# Patient Record
Sex: FEMALE | Race: ASIAN | NOT HISPANIC OR LATINO | Employment: FULL TIME | URBAN - METROPOLITAN AREA
[De-identification: names, ages, dates, MRNs, and addresses within clinical notes are randomized per-mention and may not be internally consistent; named-entity substitution may affect disease eponyms.]

---

## 2017-01-10 ENCOUNTER — PATIENT MESSAGE (OUTPATIENT)
Dept: INTERNAL MEDICINE | Facility: CLINIC | Age: 37
End: 2017-01-10

## 2017-01-10 DIAGNOSIS — E03.4 HYPOTHYROIDISM DUE TO ACQUIRED ATROPHY OF THYROID: Primary | ICD-10-CM

## 2017-01-11 DIAGNOSIS — I10 ESSENTIAL HYPERTENSION: ICD-10-CM

## 2017-01-11 RX ORDER — HYDROCHLOROTHIAZIDE 12.5 MG/1
12.5 TABLET ORAL DAILY
Qty: 30 TABLET | Refills: 0 | Status: SHIPPED | OUTPATIENT
Start: 2017-01-11 | End: 2017-06-13 | Stop reason: SDUPTHER

## 2017-01-12 RX ORDER — LEVOTHYROXINE SODIUM 75 UG/1
75 TABLET ORAL DAILY
Qty: 30 TABLET | Refills: 5 | Status: SHIPPED | OUTPATIENT
Start: 2017-01-12 | End: 2017-06-13 | Stop reason: SDUPTHER

## 2017-03-24 ENCOUNTER — LAB VISIT (OUTPATIENT)
Dept: LAB | Facility: HOSPITAL | Age: 37
End: 2017-03-24
Attending: FAMILY MEDICINE
Payer: COMMERCIAL

## 2017-03-24 DIAGNOSIS — E03.4 HYPOTHYROIDISM DUE TO ACQUIRED ATROPHY OF THYROID: ICD-10-CM

## 2017-03-24 LAB — TSH SERPL DL<=0.005 MIU/L-ACNC: 3.78 UIU/ML

## 2017-03-24 PROCEDURE — 84443 ASSAY THYROID STIM HORMONE: CPT

## 2017-03-24 PROCEDURE — 36415 COLL VENOUS BLD VENIPUNCTURE: CPT | Mod: PO

## 2017-06-13 ENCOUNTER — PATIENT MESSAGE (OUTPATIENT)
Dept: INTERNAL MEDICINE | Facility: CLINIC | Age: 37
End: 2017-06-13

## 2017-06-13 DIAGNOSIS — I10 ESSENTIAL HYPERTENSION: ICD-10-CM

## 2017-06-15 RX ORDER — METOPROLOL SUCCINATE 100 MG/1
100 TABLET, EXTENDED RELEASE ORAL DAILY
Qty: 90 TABLET | Refills: 3 | Status: SHIPPED | OUTPATIENT
Start: 2017-06-15 | End: 2018-07-09 | Stop reason: SDUPTHER

## 2017-06-15 RX ORDER — LEVOTHYROXINE SODIUM 75 UG/1
75 TABLET ORAL DAILY
Qty: 90 TABLET | Refills: 3 | Status: SHIPPED | OUTPATIENT
Start: 2017-06-15 | End: 2018-07-09 | Stop reason: SDUPTHER

## 2017-06-15 RX ORDER — HYDROCHLOROTHIAZIDE 12.5 MG/1
12.5 TABLET ORAL DAILY
Qty: 90 TABLET | Refills: 3 | Status: SHIPPED | OUTPATIENT
Start: 2017-06-15 | End: 2017-10-23

## 2017-09-05 ENCOUNTER — LAB VISIT (OUTPATIENT)
Dept: LAB | Facility: HOSPITAL | Age: 37
End: 2017-09-05
Attending: FAMILY MEDICINE
Payer: COMMERCIAL

## 2017-09-05 DIAGNOSIS — E03.2 IATROGENIC HYPOTHYROIDISM: ICD-10-CM

## 2017-09-05 DIAGNOSIS — I10 ESSENTIAL HYPERTENSION: ICD-10-CM

## 2017-09-05 LAB
ANION GAP SERPL CALC-SCNC: 6 MMOL/L
BUN SERPL-MCNC: 14 MG/DL
CALCIUM SERPL-MCNC: 9.4 MG/DL
CHLORIDE SERPL-SCNC: 108 MMOL/L
CHOLEST SERPL-MCNC: 188 MG/DL
CHOLEST/HDLC SERPL: 3.5 {RATIO}
CO2 SERPL-SCNC: 28 MMOL/L
CREAT SERPL-MCNC: 0.8 MG/DL
EST. GFR  (AFRICAN AMERICAN): >60 ML/MIN/1.73 M^2
EST. GFR  (NON AFRICAN AMERICAN): >60 ML/MIN/1.73 M^2
GLUCOSE SERPL-MCNC: 90 MG/DL
HDLC SERPL-MCNC: 54 MG/DL
HDLC SERPL: 28.7 %
LDLC SERPL CALC-MCNC: 121 MG/DL
NONHDLC SERPL-MCNC: 134 MG/DL
POTASSIUM SERPL-SCNC: 3.8 MMOL/L
SODIUM SERPL-SCNC: 142 MMOL/L
TRIGL SERPL-MCNC: 65 MG/DL
TSH SERPL DL<=0.005 MIU/L-ACNC: 1.64 UIU/ML

## 2017-09-05 PROCEDURE — 80048 BASIC METABOLIC PNL TOTAL CA: CPT

## 2017-09-05 PROCEDURE — 84443 ASSAY THYROID STIM HORMONE: CPT

## 2017-09-05 PROCEDURE — 36415 COLL VENOUS BLD VENIPUNCTURE: CPT | Mod: PO

## 2017-09-05 PROCEDURE — 80061 LIPID PANEL: CPT

## 2017-09-18 ENCOUNTER — TELEPHONE (OUTPATIENT)
Dept: OBSTETRICS AND GYNECOLOGY | Facility: CLINIC | Age: 37
End: 2017-09-18

## 2017-09-18 DIAGNOSIS — Z30.41 ENCOUNTER FOR SURVEILLANCE OF CONTRACEPTIVE PILLS: ICD-10-CM

## 2017-09-18 DIAGNOSIS — I10 ESSENTIAL HYPERTENSION: ICD-10-CM

## 2017-09-19 RX ORDER — ACETAMINOPHEN AND CODEINE PHOSPHATE 120; 12 MG/5ML; MG/5ML
1 SOLUTION ORAL DAILY
Qty: 30 TABLET | Refills: 2 | Status: SHIPPED | OUTPATIENT
Start: 2017-09-19 | End: 2018-10-30

## 2017-09-19 NOTE — TELEPHONE ENCOUNTER
Attempted to call pt, no answer left message stating that refill was sent to pharmacy and pt would need to make a annual appointment in October. Message also stated that if pt have any questions she can give us a call at the clinic.

## 2017-10-23 ENCOUNTER — OFFICE VISIT (OUTPATIENT)
Dept: INTERNAL MEDICINE | Facility: CLINIC | Age: 37
End: 2017-10-23
Payer: COMMERCIAL

## 2017-10-23 VITALS
TEMPERATURE: 97 F | OXYGEN SATURATION: 98 % | WEIGHT: 151.88 LBS | HEIGHT: 59 IN | BODY MASS INDEX: 30.62 KG/M2 | SYSTOLIC BLOOD PRESSURE: 134 MMHG | DIASTOLIC BLOOD PRESSURE: 94 MMHG | HEART RATE: 60 BPM

## 2017-10-23 DIAGNOSIS — Z00.00 ROUTINE HEALTH MAINTENANCE: Primary | ICD-10-CM

## 2017-10-23 PROCEDURE — 99999 PR PBB SHADOW E&M-EST. PATIENT-LVL III: CPT | Mod: PBBFAC,,, | Performed by: FAMILY MEDICINE

## 2017-10-23 PROCEDURE — 99395 PREV VISIT EST AGE 18-39: CPT | Mod: 25,S$GLB,, | Performed by: FAMILY MEDICINE

## 2017-10-23 PROCEDURE — 90715 TDAP VACCINE 7 YRS/> IM: CPT | Mod: S$GLB,,, | Performed by: FAMILY MEDICINE

## 2017-10-23 PROCEDURE — 90471 IMMUNIZATION ADMIN: CPT | Mod: S$GLB,,, | Performed by: FAMILY MEDICINE

## 2017-10-23 NOTE — PROGRESS NOTES
Subjective:       Patient ID: Sabrina Ureña is a female.    Chief Complaint: here for physical examination and issues  below    HPI Hypertension: blood pressures typ  Normal.120/80s homoep[rev attrib elev to drs office;Tolerating medicine. Now off hctz  Hypothyroidism:nl tsh. Tolerating med. asympt    D/wd wt gain since last visit. No exerc /diet modif    Past Medical History   Diagnosis Date    Hypertension     Hyperthyroidism      s/p radioact thy ablation    Iatrogenic hypothyroidism      Past Surgical History   Procedure Laterality Date    Radioactive ablation thyroid       5/13    Breast augmentation       Family History   Problem Relation Age of Onset    Hyperlipidemia Mother     Hypertension Mother     Gout Father      Social History     Social History    Marital status: Single     Spouse name: N/A    Number of children: 1    Years of education: N/A     Social History Main Topics    Smoking status: Never Smoker    Smokeless tobacco: Never Used    Alcohol use No    Drug use: No    Sexual activity: Yes     Partners: Male     Birth control/ protection: OCP     Other Topics Concern    None     Social History Narrative    Planned marriage fall 2015       Review of Systems  Cardiovascular: no chest pain  Chest: no shortness of breath  Abd: no abd pain  Remainder review of systems negative    Objective:      Physical Exam   Constitutional: She is oriented to person, place, and time. She appears well-developed and well-nourished. No distress.   HENT:   Head: Atraumatic.   Right Ear: External ear normal.   Left Ear: External ear normal.   Nose: Nose normal.   Mouth/Throat: Oropharynx is clear and moist. No oropharyngeal exudate.   bilat tms nl   Eyes: Conjunctivae and EOM are normal. Pupils are equal, round, and reactive to light. No scleral icterus.   Neck: Normal range of motion. Neck supple. No thyromegaly present.   Cardiovascular: Normal rate, regular rhythm and normal heart sounds.    No  murmur heard.  Pulmonary/Chest: Effort normal and breath sounds normal. No respiratory distress. She has no wheezes. She has no rales.   Abdominal: Soft. Bowel sounds are normal. She exhibits no distension and no mass. There is no hepatosplenomegaly. There is no tenderness. There is no rebound and no guarding.   Musculoskeletal: Normal range of motion. She exhibits no edema or tenderness.   Lymphadenopathy:     She has no cervical adenopathy.   Neurological: She is alert and oriented to person, place, and time. No cranial nerve deficit. She exhibits normal muscle tone. Coordination normal.   Skin: Skin is warm. No rash noted. No erythema. No pallor.   Psychiatric: She has a normal mood and affect. Her behavior is normal. Judgment and thought content normal.   Nursing note and vitals reviewed.      Assessment:       1. Routine health maintenance    2. Essential hypertension    3. Iatrogenic hypothyroidism        Plan:       **  Nurse bp check1*-2 weeks with bp machine check  Lab 12 months and follow up after  Routine health maintenance  -     Basic metabolic panel; Future; Expected date: 10/23/2018  -     Lipid panel; Future; Expected date: 10/23/2018  -     TSH; Future; Expected date: 10/23/2018    Other orders  -     (In Office Administered) Tdap Vaccine    declines flu shot    Add: 12/18 dropped off home bps all 130/80 x a couple bit elev

## 2017-12-05 ENCOUNTER — TELEPHONE (OUTPATIENT)
Dept: INTERNAL MEDICINE | Facility: CLINIC | Age: 37
End: 2017-12-05

## 2017-12-05 NOTE — TELEPHONE ENCOUNTER
----- Message from Vikki Kidd sent at 12/5/2017 11:55 AM CST -----  Pt at [phone]//states she is calling to make a nurse appt to have her bp checked//please call//rupert/yvonne

## 2017-12-07 ENCOUNTER — CLINICAL SUPPORT (OUTPATIENT)
Dept: INTERNAL MEDICINE | Facility: CLINIC | Age: 37
End: 2017-12-07
Payer: COMMERCIAL

## 2017-12-07 ENCOUNTER — TELEPHONE (OUTPATIENT)
Dept: INTERNAL MEDICINE | Facility: CLINIC | Age: 37
End: 2017-12-07

## 2017-12-07 VITALS — SYSTOLIC BLOOD PRESSURE: 138 MMHG | DIASTOLIC BLOOD PRESSURE: 88 MMHG

## 2017-12-07 NOTE — PROGRESS NOTES
Pt presented to clinic for bp check. 1st bp 148/86 and 2nd bp 138/88. Pts bp at home ranges 113-143/66-91.

## 2017-12-07 NOTE — TELEPHONE ENCOUNTER
----- Message from Wendy Francois MA sent at 12/7/2017  9:01 AM CST -----  Pt presented to clinic for bp check. 1st bp 148/86 and 2nd bp 138/88. Pts bp at home ranges 113-143/66-91.

## 2017-12-13 ENCOUNTER — PATIENT MESSAGE (OUTPATIENT)
Dept: INTERNAL MEDICINE | Facility: CLINIC | Age: 37
End: 2017-12-13

## 2018-06-18 DIAGNOSIS — Z12.39 BREAST CANCER SCREENING: ICD-10-CM

## 2018-07-09 RX ORDER — LEVOTHYROXINE SODIUM 75 UG/1
75 TABLET ORAL DAILY
Qty: 90 TABLET | Refills: 3 | Status: SHIPPED | OUTPATIENT
Start: 2018-07-09 | End: 2019-07-08 | Stop reason: SDUPTHER

## 2018-07-09 RX ORDER — METOPROLOL SUCCINATE 100 MG/1
100 TABLET, EXTENDED RELEASE ORAL DAILY
Qty: 90 TABLET | Refills: 3 | Status: SHIPPED | OUTPATIENT
Start: 2018-07-09 | End: 2019-07-08 | Stop reason: SDUPTHER

## 2018-10-16 ENCOUNTER — PATIENT OUTREACH (OUTPATIENT)
Dept: ADMINISTRATIVE | Facility: HOSPITAL | Age: 38
End: 2018-10-16

## 2018-10-23 ENCOUNTER — LAB VISIT (OUTPATIENT)
Dept: LAB | Facility: HOSPITAL | Age: 38
End: 2018-10-23
Attending: FAMILY MEDICINE
Payer: COMMERCIAL

## 2018-10-23 DIAGNOSIS — Z00.00 ROUTINE HEALTH MAINTENANCE: ICD-10-CM

## 2018-10-23 LAB
ANION GAP SERPL CALC-SCNC: 9 MMOL/L
BUN SERPL-MCNC: 17 MG/DL
CALCIUM SERPL-MCNC: 10.1 MG/DL
CHLORIDE SERPL-SCNC: 103 MMOL/L
CHOLEST SERPL-MCNC: 219 MG/DL
CHOLEST/HDLC SERPL: 3.7 {RATIO}
CO2 SERPL-SCNC: 25 MMOL/L
CREAT SERPL-MCNC: 0.8 MG/DL
EST. GFR  (AFRICAN AMERICAN): >60 ML/MIN/1.73 M^2
EST. GFR  (NON AFRICAN AMERICAN): >60 ML/MIN/1.73 M^2
GLUCOSE SERPL-MCNC: 81 MG/DL
HDLC SERPL-MCNC: 59 MG/DL
HDLC SERPL: 26.9 %
LDLC SERPL CALC-MCNC: 143.2 MG/DL
NONHDLC SERPL-MCNC: 160 MG/DL
POTASSIUM SERPL-SCNC: 4 MMOL/L
SODIUM SERPL-SCNC: 137 MMOL/L
TRIGL SERPL-MCNC: 84 MG/DL
TSH SERPL DL<=0.005 MIU/L-ACNC: 2.99 UIU/ML

## 2018-10-23 PROCEDURE — 84443 ASSAY THYROID STIM HORMONE: CPT

## 2018-10-23 PROCEDURE — 36415 COLL VENOUS BLD VENIPUNCTURE: CPT | Mod: PO

## 2018-10-23 PROCEDURE — 80048 BASIC METABOLIC PNL TOTAL CA: CPT

## 2018-10-23 PROCEDURE — 80061 LIPID PANEL: CPT

## 2018-10-29 ENCOUNTER — HOSPITAL ENCOUNTER (OUTPATIENT)
Dept: RADIOLOGY | Facility: HOSPITAL | Age: 38
Discharge: HOME | End: 2018-10-29
Attending: FAMILY MEDICINE
Payer: COMMERCIAL

## 2018-10-29 DIAGNOSIS — Z12.39 BREAST CANCER SCREENING: ICD-10-CM

## 2018-10-30 ENCOUNTER — OFFICE VISIT (OUTPATIENT)
Dept: INTERNAL MEDICINE | Facility: CLINIC | Age: 38
End: 2018-10-30
Payer: COMMERCIAL

## 2018-10-30 ENCOUNTER — LAB VISIT (OUTPATIENT)
Dept: LAB | Facility: HOSPITAL | Age: 38
End: 2018-10-30
Attending: FAMILY MEDICINE
Payer: COMMERCIAL

## 2018-10-30 VITALS
DIASTOLIC BLOOD PRESSURE: 86 MMHG | HEART RATE: 56 BPM | HEIGHT: 59 IN | SYSTOLIC BLOOD PRESSURE: 122 MMHG | WEIGHT: 154.56 LBS | TEMPERATURE: 99 F | BODY MASS INDEX: 31.16 KG/M2

## 2018-10-30 DIAGNOSIS — E03.2 IATROGENIC HYPOTHYROIDISM: ICD-10-CM

## 2018-10-30 DIAGNOSIS — Z12.31 ENCOUNTER FOR SCREENING MAMMOGRAM FOR MALIGNANT NEOPLASM OF BREAST: ICD-10-CM

## 2018-10-30 DIAGNOSIS — I10 ESSENTIAL HYPERTENSION: ICD-10-CM

## 2018-10-30 DIAGNOSIS — Z00.00 ROUTINE HEALTH MAINTENANCE: Primary | ICD-10-CM

## 2018-10-30 LAB — B-HCG UR QL: NEGATIVE

## 2018-10-30 PROCEDURE — 81025 URINE PREGNANCY TEST: CPT

## 2018-10-30 PROCEDURE — 99396 PREV VISIT EST AGE 40-64: CPT | Mod: S$GLB,,, | Performed by: FAMILY MEDICINE

## 2018-10-30 PROCEDURE — 99999 PR PBB SHADOW E&M-EST. PATIENT-LVL III: CPT | Mod: PBBFAC,,, | Performed by: FAMILY MEDICINE

## 2018-10-30 NOTE — PROGRESS NOTES
Subjective:       Patient ID: Sabrina Ureña is a female.    Chief Complaint: here for physical examination and issues  below    HPI Hypertension: blood pressures typ  Normal.lerating medicine.  Hypothyroidism:nl tsh. Tolerating med. asympt    D/wd wt gain since last visit. No exerc /diet modif    Past Medical History   Diagnosis Date    Hypertension     Hyperthyroidism      s/p radioact thy ablation    Iatrogenic hypothyroidism      Past Surgical History   Procedure Laterality Date    Radioactive ablation thyroid       5/13    Breast augmentation       Family History   Problem Relation Age of Onset    Hyperlipidemia Mother     Hypertension Mother     Gout Father      Social History     Social History    Marital status: Single     Spouse name: N/A    Number of children: 1    Years of education: N/A     Social History Main Topics    Smoking status: Never Smoker    Smokeless tobacco: Never Used    Alcohol use No    Drug use: No    Sexual activity: Yes     Partners: Male     Birth control/ protection: OCP     Other Topics Concern    None     Social History Narrative    Planned marriage fall 2015       Review of Systems  Cardiovascular: no chest pain  Chest: no shortness of breath  Abd: no abd pain  Remainder review of systems negative    Objective:      Physical Exam   Constitutional: She is oriented to person, place, and time. She appears well-developed and well-nourished. No distress.   HENT:   Head: Atraumatic.   Right Ear: External ear normal.   Left Ear: External ear normal.   Nose: Nose normal.   Mouth/Throat: Oropharynx is clear and moist. No oropharyngeal exudate.   bilat tms nl   Eyes: Conjunctivae and EOM are normal. Pupils are equal, round, and reactive to light. No scleral icterus.   Neck: Normal range of motion. Neck supple. No thyromegaly present.   Cardiovascular: Normal rate, regular rhythm and normal heart sounds.    No murmur heard.  Pulmonary/Chest: Effort normal and breath sounds  normal. No respiratory distress. She has no wheezes. She has no rales.   Abdominal: Soft. Bowel sounds are normal. She exhibits no distension and no mass. There is no hepatosplenomegaly. There is no tenderness. There is no rebound and no guarding.   Musculoskeletal: Normal range of motion. She exhibits no edema or tenderness.   Lymphadenopathy:     She has no cervical adenopathy.   Neurological: She is alert and oriented to person, place, and time. No cranial nerve deficit. She exhibits normal muscle tone. Coordination normal.   Skin: Skin is warm. No rash noted. No erythema. No pallor.   Psychiatric: She has a normal mood and affect. Her behavior is normal. Judgment and thought content normal.   Nursing note and vitals reviewed.      Assessment:       1. Routine health maintenance    2. Essential hypertension    3. Iatrogenic hypothyroidism        Plan:       **  Lab 12 months and follow up after  declines flu shot this time. May consider next year  req upt upcoming mammo  She will sched pap  Routine health maintenance    Essential hypertension  -     Basic metabolic panel; Future; Expected date: 10/30/2019  -     Lipid panel; Future; Expected date: 10/30/2019  -     Pregnancy, urine rapid; Future; Expected date: 10/30/2018    Iatrogenic hypothyroidism  -     TSH; Future; Expected date: 10/30/2019    Encounter for screening mammogram for malignant neoplasm of breast  -     Cancel: Mammo Digital Screening Bilat; Future; Expected date: 10/30/2018

## 2018-11-02 DIAGNOSIS — Z12.39 BREAST CANCER SCREENING: ICD-10-CM

## 2019-06-11 ENCOUNTER — PATIENT MESSAGE (OUTPATIENT)
Dept: INTERNAL MEDICINE | Facility: CLINIC | Age: 39
End: 2019-06-11

## 2019-06-11 RX ORDER — ACYCLOVIR 400 MG/1
400 TABLET ORAL 2 TIMES DAILY
Qty: 10 TABLET | Refills: 11 | Status: ON HOLD | OUTPATIENT
Start: 2019-06-11 | End: 2021-12-15 | Stop reason: SDUPTHER

## 2019-07-09 RX ORDER — METOPROLOL SUCCINATE 100 MG/1
100 TABLET, EXTENDED RELEASE ORAL DAILY
Qty: 90 TABLET | Refills: 3 | Status: SHIPPED | OUTPATIENT
Start: 2019-07-09 | End: 2019-11-05 | Stop reason: ALTCHOICE

## 2019-07-09 RX ORDER — LEVOTHYROXINE SODIUM 75 UG/1
75 TABLET ORAL DAILY
Qty: 90 TABLET | Refills: 3 | Status: SHIPPED | OUTPATIENT
Start: 2019-07-09 | End: 2019-12-04 | Stop reason: ALTCHOICE

## 2019-10-02 ENCOUNTER — PATIENT MESSAGE (OUTPATIENT)
Dept: INTERNAL MEDICINE | Facility: CLINIC | Age: 39
End: 2019-10-02

## 2019-10-02 DIAGNOSIS — E03.4 HYPOTHYROIDISM DUE TO ACQUIRED ATROPHY OF THYROID: ICD-10-CM

## 2019-10-02 DIAGNOSIS — Z32.01 PREGNANCY TEST POSITIVE: ICD-10-CM

## 2019-10-02 DIAGNOSIS — I10 ESSENTIAL HYPERTENSION: Primary | ICD-10-CM

## 2019-10-10 ENCOUNTER — PATIENT OUTREACH (OUTPATIENT)
Dept: ADMINISTRATIVE | Facility: HOSPITAL | Age: 39
End: 2019-10-10

## 2019-10-11 ENCOUNTER — PATIENT MESSAGE (OUTPATIENT)
Dept: INTERNAL MEDICINE | Facility: CLINIC | Age: 39
End: 2019-10-11

## 2019-10-11 NOTE — TELEPHONE ENCOUNTER
Please change lab to what I just ordered adding serum preg test; can do today if she wants; instead of 10.13

## 2019-10-11 NOTE — TELEPHONE ENCOUNTER
Odette reply sent advising pt per Dr. Telles pregnancy blood test ordered and lab appt r/s to today 10/11/19.

## 2019-10-18 ENCOUNTER — LAB VISIT (OUTPATIENT)
Dept: LAB | Facility: HOSPITAL | Age: 39
End: 2019-10-18
Attending: FAMILY MEDICINE

## 2019-10-18 DIAGNOSIS — I10 ESSENTIAL HYPERTENSION: ICD-10-CM

## 2019-10-18 DIAGNOSIS — E03.2 IATROGENIC HYPOTHYROIDISM: ICD-10-CM

## 2019-10-18 DIAGNOSIS — Z32.01 PREGNANCY TEST POSITIVE: ICD-10-CM

## 2019-10-18 LAB
ANION GAP SERPL CALC-SCNC: 11 MMOL/L (ref 8–16)
BUN SERPL-MCNC: 12 MG/DL (ref 6–20)
CALCIUM SERPL-MCNC: 9.9 MG/DL (ref 8.7–10.5)
CHLORIDE SERPL-SCNC: 103 MMOL/L (ref 95–110)
CHOLEST SERPL-MCNC: 206 MG/DL (ref 120–199)
CHOLEST/HDLC SERPL: 3.7 {RATIO} (ref 2–5)
CO2 SERPL-SCNC: 21 MMOL/L (ref 23–29)
CREAT SERPL-MCNC: 0.8 MG/DL (ref 0.5–1.4)
EST. GFR  (AFRICAN AMERICAN): >60 ML/MIN/1.73 M^2
EST. GFR  (NON AFRICAN AMERICAN): >60 ML/MIN/1.73 M^2
GLUCOSE SERPL-MCNC: 92 MG/DL (ref 70–110)
HCG INTACT+B SERPL-ACNC: NORMAL MIU/ML
HDLC SERPL-MCNC: 55 MG/DL (ref 40–75)
HDLC SERPL: 26.7 % (ref 20–50)
LDLC SERPL CALC-MCNC: 110.4 MG/DL (ref 63–159)
NONHDLC SERPL-MCNC: 151 MG/DL
POTASSIUM SERPL-SCNC: 4 MMOL/L (ref 3.5–5.1)
SODIUM SERPL-SCNC: 135 MMOL/L (ref 136–145)
T4 FREE SERPL-MCNC: 1.05 NG/DL (ref 0.71–1.51)
TRIGL SERPL-MCNC: 203 MG/DL (ref 30–150)
TSH SERPL DL<=0.005 MIU/L-ACNC: 5.08 UIU/ML (ref 0.4–4)

## 2019-10-18 PROCEDURE — 84439 ASSAY OF FREE THYROXINE: CPT

## 2019-10-18 PROCEDURE — 36415 COLL VENOUS BLD VENIPUNCTURE: CPT

## 2019-10-18 PROCEDURE — 80048 BASIC METABOLIC PNL TOTAL CA: CPT

## 2019-10-18 PROCEDURE — 80061 LIPID PANEL: CPT

## 2019-10-18 PROCEDURE — 84443 ASSAY THYROID STIM HORMONE: CPT

## 2019-10-18 PROCEDURE — 84702 CHORIONIC GONADOTROPIN TEST: CPT

## 2019-10-22 ENCOUNTER — PATIENT MESSAGE (OUTPATIENT)
Dept: INTERNAL MEDICINE | Facility: CLINIC | Age: 39
End: 2019-10-22

## 2019-10-22 DIAGNOSIS — E03.4 HYPOTHYROIDISM DUE TO ACQUIRED ATROPHY OF THYROID: Primary | ICD-10-CM

## 2019-10-23 ENCOUNTER — PATIENT MESSAGE (OUTPATIENT)
Dept: ADMINISTRATIVE | Facility: HOSPITAL | Age: 39
End: 2019-10-23

## 2019-10-23 ENCOUNTER — PATIENT OUTREACH (OUTPATIENT)
Dept: ADMINISTRATIVE | Facility: HOSPITAL | Age: 39
End: 2019-10-23

## 2019-10-23 NOTE — TELEPHONE ENCOUNTER
Please let her know we can keep the dose the same and basilio in a month with no missed doses and taking in am one hour prior to anything else by mouth except water.   Basilio tsh in a month  Also she can discuss with OB she will be seeing for an alternative plan if they feel better

## 2019-10-28 ENCOUNTER — OFFICE VISIT (OUTPATIENT)
Dept: OBSTETRICS AND GYNECOLOGY | Facility: CLINIC | Age: 39
End: 2019-10-28
Payer: COMMERCIAL

## 2019-10-28 ENCOUNTER — LAB VISIT (OUTPATIENT)
Dept: LAB | Facility: HOSPITAL | Age: 39
End: 2019-10-28
Attending: OBSTETRICS & GYNECOLOGY
Payer: COMMERCIAL

## 2019-10-28 VITALS
BODY MASS INDEX: 30.55 KG/M2 | SYSTOLIC BLOOD PRESSURE: 133 MMHG | DIASTOLIC BLOOD PRESSURE: 88 MMHG | WEIGHT: 151.25 LBS

## 2019-10-28 DIAGNOSIS — N91.2 AMENORRHEA: ICD-10-CM

## 2019-10-28 DIAGNOSIS — N91.2 AMENORRHEA: Primary | ICD-10-CM

## 2019-10-28 LAB
ABO + RH BLD: NORMAL
BASOPHILS # BLD AUTO: 0.04 K/UL (ref 0–0.2)
BASOPHILS NFR BLD: 0.4 % (ref 0–1.9)
DIFFERENTIAL METHOD: ABNORMAL
EOSINOPHIL # BLD AUTO: 0.1 K/UL (ref 0–0.5)
EOSINOPHIL NFR BLD: 0.9 % (ref 0–8)
ERYTHROCYTE [DISTWIDTH] IN BLOOD BY AUTOMATED COUNT: 13.2 % (ref 11.5–14.5)
HCT VFR BLD AUTO: 38.9 % (ref 37–48.5)
HGB BLD-MCNC: 12.7 G/DL (ref 12–16)
IMM GRANULOCYTES # BLD AUTO: 0.04 K/UL (ref 0–0.04)
IMM GRANULOCYTES NFR BLD AUTO: 0.4 % (ref 0–0.5)
LYMPHOCYTES # BLD AUTO: 1.5 K/UL (ref 1–4.8)
LYMPHOCYTES NFR BLD: 16.3 % (ref 18–48)
MCH RBC QN AUTO: 30.5 PG (ref 27–31)
MCHC RBC AUTO-ENTMCNC: 32.6 G/DL (ref 32–36)
MCV RBC AUTO: 93 FL (ref 82–98)
MONOCYTES # BLD AUTO: 0.9 K/UL (ref 0.3–1)
MONOCYTES NFR BLD: 9.6 % (ref 4–15)
NEUTROPHILS # BLD AUTO: 6.8 K/UL (ref 1.8–7.7)
NEUTROPHILS NFR BLD: 72.4 % (ref 38–73)
NRBC BLD-RTO: 0 /100 WBC
PLATELET # BLD AUTO: 251 K/UL (ref 150–350)
PMV BLD AUTO: 10.6 FL (ref 9.2–12.9)
RBC # BLD AUTO: 4.17 M/UL (ref 4–5.4)
WBC # BLD AUTO: 9.33 K/UL (ref 3.9–12.7)

## 2019-10-28 PROCEDURE — 86592 SYPHILIS TEST NON-TREP QUAL: CPT

## 2019-10-28 PROCEDURE — 99999 PR PBB SHADOW E&M-EST. PATIENT-LVL II: ICD-10-PCS | Mod: PBBFAC,,, | Performed by: OBSTETRICS & GYNECOLOGY

## 2019-10-28 PROCEDURE — 36415 COLL VENOUS BLD VENIPUNCTURE: CPT

## 2019-10-28 PROCEDURE — 81220 CFTR GENE COM VARIANTS: CPT

## 2019-10-28 PROCEDURE — 0500F INITIAL PRENATAL CARE VISIT: CPT | Mod: S$GLB,,, | Performed by: OBSTETRICS & GYNECOLOGY

## 2019-10-28 PROCEDURE — 85025 COMPLETE CBC W/AUTO DIFF WBC: CPT

## 2019-10-28 PROCEDURE — 83020 HEMOGLOBIN ELECTROPHORESIS: CPT

## 2019-10-28 PROCEDURE — 0500F PR INITIAL PRENATAL CARE VISIT: ICD-10-PCS | Mod: S$GLB,,, | Performed by: OBSTETRICS & GYNECOLOGY

## 2019-10-28 PROCEDURE — 86703 HIV-1/HIV-2 1 RESULT ANTBDY: CPT

## 2019-10-28 PROCEDURE — 99999 PR PBB SHADOW E&M-EST. PATIENT-LVL II: CPT | Mod: PBBFAC,,, | Performed by: OBSTETRICS & GYNECOLOGY

## 2019-10-28 PROCEDURE — 87340 HEPATITIS B SURFACE AG IA: CPT

## 2019-10-28 PROCEDURE — 86762 RUBELLA ANTIBODY: CPT

## 2019-10-28 PROCEDURE — 86901 BLOOD TYPING SEROLOGIC RH(D): CPT

## 2019-10-28 NOTE — PATIENT INSTRUCTIONS
Adapting to Pregnancy: First Trimester  As your body adjusts, you may have to change or limit your daily activities. Youll need more rest. You may also need to use the energy you have more wisely.     Eat stomach-friendly foods like cottage cheese, crackers, or bread throughout the day.   Your changing body  Almost every part of your body is affected as you adapt to pregnancy. The uterus and cervix will begin to soften right away. You may not look very pregnant during the first 3 months. But you are likely to have some common signs of early pregnancy:  · Nausea  · Fatigue  · Frequent urination  · Mood swings  · Bloating of the abdomen  · Missed or light periods (first trimester bleeding)  · Nipple or breast tenderness, breast swelling  Its not too late to start good habits  What matters most is protecting your baby from this moment on. If you smoke, drink alcohol, or use drugs, now is the time to stop. If you need help, talk with your healthcare provider.  · Smoking increases the risk of  stillbirth or having a low-birth-weight baby. If you smoke, quit now.  · Alcohol and drugs have been linked with miscarriage, birth defects, intellectual disability, and low birth weight. Do not drink alcohol or take drugs.  Tips to relieve nausea  Although nausea can happen at any time of the day, it may be worse in the morning. To help prevent nausea:  · Eat small, light meals at frequent intervals.  · Get up slowly. Eat a few unsalted crackers before you get out of bed.  · Avoid smells that bother you.  · Avoid spicy and fatty foods.  · Eat an ice pop in your favorite flavor.  · Get plenty of rest.  · Ask your healthcare provider about taking mir or vitamin B6 for nausea and vomiting.  · Talk with your healthcare provider if you take vitamins that upset your stomach.  Work concerns  The end of the first trimester is a good time to discuss working during pregnancy with your employer. Follow your healthcare providers  advice if your job requires you to stand for a long time, work with hazardous tools, or even sit at a desk all day. Your workspace, workload, or scheduled hours may need to be adjusted. Perhaps you can change body postures more often or take an extra break.  Advice for travel  Talk to your healthcare provider first, but the second trimester may be the best time for any travel. You may be advised to avoid certain trips while youre pregnant. Food and water can be concerns in developing countries. Travel by car is a good choice, as you can stop, get out, and stretch. Bring snacks and water along. Fasten the lap belt below your belly, low over your hips. Also be sure to wear the shoulder harness.  Intimacy  Unless your healthcare provider tells you to, there is no reason to stop having sex while youre pregnant. You or your partner may notice changes in desire. Desire may be less in the first trimester, due to nausea and fatigue. In the second trimester, sex may be very enjoyable. The third trimester can be a challenge comfort-wise. Try different positions and see whats best for you both.  Date Last Reviewed: 8/16/2015  © 0660-0001 [employer] [address] [place] [address], [place] [address]. All rights reserved. This information is not intended as a substitute for professional medical care. Always follow your healthcare professional's instructions.

## 2019-10-28 NOTE — PROGRESS NOTES
Subjective:      Sabrina Ureña is a 39 y.o. female who presents for evaluation of amenorrhea. She believes she could be pregnant. unexpected, but will continue the pregnancy Sexual Activity: single partner, contraception: none. Current symptoms also include: breast tenderness, positive home pregnancy test and 5 home pregnancy tests. Last period was abnormal.     Patient's last menstrual period was 2019 (within days).      Review of Systems  Constitutional: negative  Eyes: negative  Ears, nose, mouth, throat, and face: negative  Respiratory: negative  Cardiovascular: negative  Gastrointestinal: negative  Genitourinary:negative  Integument/breast: negative  Hematologic/lymphatic: negative  Musculoskeletal:negative  Neurological: negative  Behavioral/Psych: negative  Endocrine: negative  Allergic/Immunologic: negative       Objective:      /88   Wt 68.6 kg (151 lb 3.8 oz)   LMP 2019 (Within Days)   BMI 30.55 kg/m²   General: no acute distress      Lab Review  Urine HCG: positive      Assessment:      Absence of menstruation.       Plan:      Pregnancy Test: Positive: EDC: 06/15/2020. Briefly discussed pre- care options. Pregnancy, Childbirth and the [place] book given. Encouraged well-balanced diet, plenty of rest when needed, pre-gisella vitamins daily and walking for exercise. Discussed self-help for nausea, avoiding OTC medications until consulting provider or pharmacist, other than Tylenol as needed, minimal caffeine (1-2 cups daily) and avoiding alcohol. She will schedule her initial OB visit in the next month with her PCP or OB provider. Feel free to call with any questions. RTO 1 week for first prenatal visit and ultrasound      Continue blood pressure medication  Will do genetic screening after dates are confirmed  Patient wants amino and mfs consult.

## 2019-10-29 LAB
HBV SURFACE AG SERPL QL IA: NEGATIVE
HIV 1+2 AB+HIV1 P24 AG SERPL QL IA: NEGATIVE
RPR SER QL: NORMAL
RUBV IGG SER-ACNC: 12.2 IU/ML
RUBV IGG SER-IMP: REACTIVE

## 2019-10-30 LAB
CFTR MUT ANL BLD/T: NORMAL
HGB A2 MFR BLD HPLC: 2.6 % (ref 2.2–3.2)
HGB FRACT BLD ELPH-IMP: NORMAL
HGB FRACT BLD ELPH-IMP: NORMAL

## 2019-11-04 ENCOUNTER — OFFICE VISIT (OUTPATIENT)
Dept: INTERNAL MEDICINE | Facility: CLINIC | Age: 39
End: 2019-11-04
Payer: COMMERCIAL

## 2019-11-04 VITALS
SYSTOLIC BLOOD PRESSURE: 140 MMHG | BODY MASS INDEX: 30.27 KG/M2 | HEIGHT: 59 IN | TEMPERATURE: 98 F | DIASTOLIC BLOOD PRESSURE: 80 MMHG | WEIGHT: 150.13 LBS

## 2019-11-04 DIAGNOSIS — E03.2 IATROGENIC HYPOTHYROIDISM: ICD-10-CM

## 2019-11-04 DIAGNOSIS — I10 ESSENTIAL HYPERTENSION: ICD-10-CM

## 2019-11-04 DIAGNOSIS — Z00.00 ROUTINE HEALTH MAINTENANCE: Primary | ICD-10-CM

## 2019-11-04 PROCEDURE — 99999 PR PBB SHADOW E&M-EST. PATIENT-LVL III: ICD-10-PCS | Mod: PBBFAC,,, | Performed by: FAMILY MEDICINE

## 2019-11-04 PROCEDURE — 99396 PREV VISIT EST AGE 40-64: CPT | Mod: S$GLB,,, | Performed by: FAMILY MEDICINE

## 2019-11-04 PROCEDURE — 99999 PR PBB SHADOW E&M-EST. PATIENT-LVL III: CPT | Mod: PBBFAC,,, | Performed by: FAMILY MEDICINE

## 2019-11-04 PROCEDURE — 99396 PR PREVENTIVE VISIT,EST,40-64: ICD-10-PCS | Mod: S$GLB,,, | Performed by: FAMILY MEDICINE

## 2019-11-04 PROCEDURE — 90471 IMMUNIZATION ADMIN: CPT | Mod: PBBFAC

## 2019-11-04 NOTE — PROGRESS NOTES
Subjective:       Patient ID: Sabrina Ureña is a female.    Chief Complaint: here for physical examination and issues  below    HPI Hypertension: blood pressures typ  Normal.lerating medicine.  Hypothyroidism:5 tsh. Had missed some doses . D/wd dosing. Tolerating med. asympt    +_ pregnancy test. U/s and ob planned tomorrow. She has d/wd current bp med and plan basilio tsh and OB dr delgado with this per her    Past Medical History   Diagnosis Date    Hypertension     Hyperthyroidism      s/p radioact thy ablation    Iatrogenic hypothyroidism      Past Surgical History   Procedure Laterality Date    Radioactive ablation thyroid       5/13    Breast augmentation       Family History   Problem Relation Age of Onset    Hyperlipidemia Mother     Hypertension Mother     Gout Father      Social History     Social History    Marital status: Single     Spouse name: N/A    Number of children: 1    Years of education: N/A     Social History Main Topics    Smoking status: Never Smoker    Smokeless tobacco: Never Used    Alcohol use No    Drug use: No    Sexual activity: Yes     Partners: Male     Birth control/ protection: OCP     Other Topics Concern    None     Social History Narrative           Review of Systems  Cardiovascular: no chest pain  Chest: no shortness of breath  Abd: no abd pain  Remainder review of systems negative    Objective:      Physical Exam   Constitutional: She is oriented to person, place, and time. She appears well-developed and well-nourished. No distress.   HENT:   Head: Atraumatic.   Right Ear: External ear normal.   Left Ear: External ear normal.   Nose: Nose normal.   Mouth/Throat: Oropharynx is clear and moist. No oropharyngeal exudate.   bilat tms nl   Eyes: Conjunctivae and EOM are normal. Pupils are equal, round, and reactive to light. No scleral icterus.   Neck: Normal range of motion. Neck supple. No thyromegaly present.   Cardiovascular: Normal rate, regular rhythm and normal  heart sounds.    No murmur heard.  Pulmonary/Chest: Effort normal and breath sounds normal. No respiratory distress. She has no wheezes. She has no rales.   Abdominal: Soft. Bowel sounds are normal. She exhibits no distension and no mass. There is no hepatosplenomegaly. There is no tenderness. There is no rebound and no guarding.   Musculoskeletal: Normal range of motion. She exhibits no edema or tenderness.   Lymphadenopathy:     She has no cervical adenopathy.   Neurological: She is alert and oriented to person, place, and time. No cranial nerve deficit. She exhibits normal muscle tone. Coordination normal.   Skin: Skin is warm. No rash noted. No erythema. No pallor.   Psychiatric: She has a normal mood and affect. Her behavior is normal. Judgment and thought content normal.   Nursing note and vitals reviewed.      Assessment:       1. Routine health maintenance    2. Essential hypertension    3. Iatrogenic hypothyroidism      + preg test  Plan:       **tsh few weeks (s/p taking med consistently )    flu shot  Lab 12 months and follow up after    Routine health maintenance    Iatrogenic hypothyroidism  -     TSH; Future; Expected date: 11/03/2020    Essential hypertension  -     Comprehensive metabolic panel; Future; Expected date: 11/03/2020  -     Lipid panel; Future; Expected date: 11/03/2020    tsh last week November  Also Lab including tsh 12 months and follow up after  nurse blood pressure visit one week    Monitor /record home blood pressures different times today after 2 minute rest and drop off with nurse blood pressure visit one week    Add: I spoke w dr chisholm via [employer] early 12/19. She is adjusting and recking thyroid

## 2019-11-04 NOTE — PATIENT INSTRUCTIONS
Monitor /record home blood pressures different times today after 2 minute rest and drop off with nurse blood pressure visit one week

## 2019-11-05 ENCOUNTER — INITIAL PRENATAL (OUTPATIENT)
Dept: OBSTETRICS AND GYNECOLOGY | Facility: CLINIC | Age: 39
End: 2019-11-05
Payer: COMMERCIAL

## 2019-11-05 ENCOUNTER — PROCEDURE VISIT (OUTPATIENT)
Dept: OBSTETRICS AND GYNECOLOGY | Facility: CLINIC | Age: 39
End: 2019-11-05
Payer: COMMERCIAL

## 2019-11-05 VITALS
SYSTOLIC BLOOD PRESSURE: 130 MMHG | BODY MASS INDEX: 30.37 KG/M2 | WEIGHT: 150.38 LBS | DIASTOLIC BLOOD PRESSURE: 70 MMHG

## 2019-11-05 DIAGNOSIS — Z34.91 INITIAL OBSTETRIC VISIT IN FIRST TRIMESTER: Primary | ICD-10-CM

## 2019-11-05 DIAGNOSIS — O09.521 MULTIGRAVIDA OF ADVANCED MATERNAL AGE IN FIRST TRIMESTER: ICD-10-CM

## 2019-11-05 DIAGNOSIS — Z36.3 ANTENATAL SCREENING FOR MALFORMATION USING ULTRASONICS: ICD-10-CM

## 2019-11-05 DIAGNOSIS — N91.2 AMENORRHEA: ICD-10-CM

## 2019-11-05 LAB
BACTERIAL VAGINOSIS DNA: NEGATIVE
C TRACH DNA SPEC QL NAA+PROBE: NOT DETECTED
CANDIDA GLABRATA DNA: NEGATIVE
CANDIDA KRUSEI DNA: NEGATIVE
CANDIDA RRNA VAG QL PROBE: NEGATIVE
N GONORRHOEA DNA SPEC QL NAA+PROBE: NOT DETECTED
T VAGINALIS RRNA GENITAL QL PROBE: NEGATIVE

## 2019-11-05 PROCEDURE — 0500F PR INITIAL PRENATAL CARE VISIT: ICD-10-PCS | Mod: S$GLB,,, | Performed by: OBSTETRICS & GYNECOLOGY

## 2019-11-05 PROCEDURE — 99999 PR PBB SHADOW E&M-EST. PATIENT-LVL III: ICD-10-PCS | Mod: PBBFAC,,, | Performed by: OBSTETRICS & GYNECOLOGY

## 2019-11-05 PROCEDURE — 88175 CYTOPATH C/V AUTO FLUID REDO: CPT

## 2019-11-05 PROCEDURE — 99999 PR PBB SHADOW E&M-EST. PATIENT-LVL III: CPT | Mod: PBBFAC,,, | Performed by: OBSTETRICS & GYNECOLOGY

## 2019-11-05 PROCEDURE — 87661 TRICHOMONAS VAGINALIS AMPLIF: CPT

## 2019-11-05 PROCEDURE — 87481 CANDIDA DNA AMP PROBE: CPT | Mod: 59

## 2019-11-05 PROCEDURE — 87086 URINE CULTURE/COLONY COUNT: CPT

## 2019-11-05 PROCEDURE — 0500F INITIAL PRENATAL CARE VISIT: CPT | Mod: S$GLB,,, | Performed by: OBSTETRICS & GYNECOLOGY

## 2019-11-05 PROCEDURE — 87491 CHLMYD TRACH DNA AMP PROBE: CPT

## 2019-11-05 PROCEDURE — 87801 DETECT AGNT MULT DNA AMPLI: CPT

## 2019-11-05 PROCEDURE — 76801 OB US < 14 WKS SINGLE FETUS: CPT | Mod: S$GLB,,, | Performed by: OBSTETRICS & GYNECOLOGY

## 2019-11-05 PROCEDURE — 76801 OB US < 14 WKS SINGLE FETUS: CPT | Mod: PBBFAC | Performed by: OBSTETRICS & GYNECOLOGY

## 2019-11-05 PROCEDURE — 76801 PR US, OB <14WKS, TRANSABD, SINGLE GESTATION: ICD-10-PCS | Mod: S$GLB,,, | Performed by: OBSTETRICS & GYNECOLOGY

## 2019-11-05 RX ORDER — LABETALOL 100 MG/1
100 TABLET, FILM COATED ORAL EVERY 12 HOURS
Qty: 60 TABLET | Refills: 5 | Status: SHIPPED | OUTPATIENT
Start: 2019-11-05 | End: 2019-12-04 | Stop reason: ALTCHOICE

## 2019-11-05 NOTE — PATIENT INSTRUCTIONS
Adapting to Pregnancy: First Trimester  As your body adjusts, you may have to change or limit your daily activities. Youll need more rest. You may also need to use the energy you have more wisely.     Eat stomach-friendly foods like cottage cheese, crackers, or bread throughout the day.   Your changing body  Almost every part of your body is affected as you adapt to pregnancy. The uterus and cervix will begin to soften right away. You may not look very pregnant during the first 3 months. But you are likely to have some common signs of early pregnancy:  · Nausea  · Fatigue  · Frequent urination  · Mood swings  · Bloating of the abdomen  · Missed or light periods (first trimester bleeding)  · Nipple or breast tenderness, breast swelling  Its not too late to start good habits  What matters most is protecting your baby from this moment on. If you smoke, drink alcohol, or use drugs, now is the time to stop. If you need help, talk with your healthcare provider.  · Smoking increases the risk of  stillbirth or having a low-birth-weight baby. If you smoke, quit now.  · Alcohol and drugs have been linked with miscarriage, birth defects, intellectual disability, and low birth weight. Do not drink alcohol or take drugs.  Tips to relieve nausea  Although nausea can happen at any time of the day, it may be worse in the morning. To help prevent nausea:  · Eat small, light meals at frequent intervals.  · Get up slowly. Eat a few unsalted crackers before you get out of bed.  · Avoid smells that bother you.  · Avoid spicy and fatty foods.  · Eat an ice pop in your favorite flavor.  · Get plenty of rest.  · Ask your healthcare provider about taking mir or vitamin B6 for nausea and vomiting.  · Talk with your healthcare provider if you take vitamins that upset your stomach.  Work concerns  The end of the first trimester is a good time to discuss working during pregnancy with your employer. Follow your healthcare providers  advice if your job requires you to stand for a long time, work with hazardous tools, or even sit at a desk all day. Your workspace, workload, or scheduled hours may need to be adjusted. Perhaps you can change body postures more often or take an extra break.  Advice for travel  Talk to your healthcare provider first, but the second trimester may be the best time for any travel. You may be advised to avoid certain trips while youre pregnant. Food and water can be concerns in developing countries. Travel by car is a good choice, as you can stop, get out, and stretch. Bring snacks and water along. Fasten the lap belt below your belly, low over your hips. Also be sure to wear the shoulder harness.  Intimacy  Unless your healthcare provider tells you to, there is no reason to stop having sex while youre pregnant. You or your partner may notice changes in desire. Desire may be less in the first trimester, due to nausea and fatigue. In the second trimester, sex may be very enjoyable. The third trimester can be a challenge comfort-wise. Try different positions and see whats best for you both.  Date Last Reviewed: 8/16/2015  © 3455-2161 [employer]. [address] [place] [address], [place] [address]. All rights reserved. This information is not intended as a substitute for professional medical care. Always follow your healthcare professional's instructions.

## 2019-11-05 NOTE — PROGRESS NOTES
Will change blood pressure medication to labetalol.  Dates confirmed with ultrasound today.  Prenatal physical, pap, and cultures taken today.  RTO 1 week for NT.  All questions answered.

## 2019-11-07 LAB — BACTERIA UR CULT: NO GROWTH

## 2019-11-12 ENCOUNTER — PROCEDURE VISIT (OUTPATIENT)
Dept: OBSTETRICS AND GYNECOLOGY | Facility: CLINIC | Age: 39
End: 2019-11-12
Payer: COMMERCIAL

## 2019-11-12 ENCOUNTER — PATIENT MESSAGE (OUTPATIENT)
Dept: OBSTETRICS AND GYNECOLOGY | Facility: CLINIC | Age: 39
End: 2019-11-12

## 2019-11-12 DIAGNOSIS — Z36.3 ANTENATAL SCREENING FOR MALFORMATION USING ULTRASONICS: Primary | ICD-10-CM

## 2019-11-12 DIAGNOSIS — Z36.3 ANTENATAL SCREENING FOR MALFORMATION USING ULTRASONICS: ICD-10-CM

## 2019-11-19 ENCOUNTER — TELEPHONE (OUTPATIENT)
Dept: OBSTETRICS AND GYNECOLOGY | Facility: CLINIC | Age: 39
End: 2019-11-19

## 2019-11-19 NOTE — TELEPHONE ENCOUNTER
----- Message from Cristine Brown sent at 11/19/2019 10:06 AM CST -----  Contact: [facility]  Needs a medical necessity form for patients US that is scheduled for Friday, please fax to [phone], please call her back if needed at [phone]. Thank you

## 2019-11-20 ENCOUNTER — TELEPHONE (OUTPATIENT)
Dept: OBSTETRICS AND GYNECOLOGY | Facility: CLINIC | Age: 39
End: 2019-11-20

## 2019-11-20 NOTE — TELEPHONE ENCOUNTER
Spoke with Madelin, Message forward to Dr hong staff. Lex MCCONNELL ----- Message from Joie Paniagua sent at 11/20/2019 10:16 AM CST -----  Contact: Madelin with [employer]  Type: Needs Medical Advice    Who Called:  Madelin with [facility]  Symptoms (please be specific):  na  How long has patient had these symptoms:  na  Pharmacy name and phone #:  na  Best Call Back Number: [phone]  Additional Information: states that she has left several messages. States that she is scheduled for an ultrasound on tomorrow and it is crucial that she receives a call back today. Please call to advise/discuss, thank you!

## 2019-11-20 NOTE — TELEPHONE ENCOUNTER
Returned call to (PRIETO with [facility]), no answer, left voicemail to return call to clinic.

## 2019-11-20 NOTE — TELEPHONE ENCOUNTER
----- Message from Katherin Ambriz sent at 11/20/2019  4:28 PM CST -----  [facility]  Pt is needing ultrasound on Friday. Kaylee has faxed request for pt and has not received a response. Kaylee states this is an urgent matter for pt. Please call Kaylee back at [phone].

## 2019-11-25 ENCOUNTER — TELEPHONE (OUTPATIENT)
Dept: OBSTETRICS AND GYNECOLOGY | Facility: CLINIC | Age: 39
End: 2019-11-25

## 2019-11-25 NOTE — TELEPHONE ENCOUNTER
----- Message from Katherin Ambriz sent at 11/25/2019 11:45 AM CST -----  Contact: Kaylee-   Type:  Patient Returning Call    Who Called: Kaylee  Who Left Message for Patient: Julia  Does the patient know what this is regarding?: n/a  Would the patient rather a call back or a response via [facility]? call  Best Call Back Number: [phone]  Additional Information: n/a

## 2019-11-25 NOTE — TELEPHONE ENCOUNTER
Spoke with Kaylee with [facility] regarding patient records . Kaylee states patient was scheduled for a NT ultrasound but had to cancel due to her insurance only covering 2 ultrasound during her pregnancy. Records was faxed to Kaylee at [phone].

## 2019-11-27 ENCOUNTER — TELEPHONE (OUTPATIENT)
Dept: OBSTETRICS AND GYNECOLOGY | Facility: CLINIC | Age: 39
End: 2019-11-27

## 2019-11-27 NOTE — TELEPHONE ENCOUNTER
----- Message from Cristine Brown sent at 11/27/2019  1:04 PM CST -----  Contact: - [facility]  Has been waiting for a long time on an copy of patients US report, but has never received anything, patient has already had to reschedule her surgery once. Please fax to [phone], please call her back at [phone]. Thank you

## 2019-11-27 NOTE — TELEPHONE ENCOUNTER
Returned call to  in regards to not receiving a copy of the pt US report. After phone rang twice the call went dead. I called twice. Same thing happened.

## 2019-11-30 ENCOUNTER — LAB VISIT (OUTPATIENT)
Dept: LAB | Facility: HOSPITAL | Age: 39
End: 2019-11-30
Attending: FAMILY MEDICINE
Payer: COMMERCIAL

## 2019-11-30 DIAGNOSIS — E03.4 HYPOTHYROIDISM DUE TO ACQUIRED ATROPHY OF THYROID: ICD-10-CM

## 2019-11-30 LAB
T4 FREE SERPL-MCNC: 0.74 NG/DL (ref 0.71–1.51)
TSH SERPL DL<=0.005 MIU/L-ACNC: 12.83 UIU/ML (ref 0.4–4)

## 2019-11-30 PROCEDURE — 84443 ASSAY THYROID STIM HORMONE: CPT

## 2019-11-30 PROCEDURE — 36415 COLL VENOUS BLD VENIPUNCTURE: CPT

## 2019-11-30 PROCEDURE — 84439 ASSAY OF FREE THYROXINE: CPT

## 2019-12-02 NOTE — TELEPHONE ENCOUNTER
Attempted to call pt, no answer, unable to leave message. Will try again later.   Chief complaint:   Chief Complaint   Patient presents with   • Follow-up     patient recently seen by Sophy Morillo NP       Vitals:  Visit Vitals  /80 (BP Location: LUE - Left upper extremity, Patient Position: Sitting, Cuff Size: Large Adult)   Pulse 88   Ht 5' 7\" (1.702 m)   Wt 83.9 kg   BMI 28.98 kg/m²       HISTORY OF PRESENT ILLNESS     HPI    Patient presents for follow-up visit.  He had seen NP 11/5/19:  \"Anxiety feels like heart is racing at times. Tightness in chest. His 23 year old son with history of drug abuse lapsed and overdosed. He has been off of work for 3 weeks .was to return to work tomorrow but is unable due to his physical symptoms . In May he was given zoloft for depression but this did not help and he did not fu or continue the med after 30 days . He denies suicidal or homicidal ideation or plan. He notes that he has a 3 year old grandson to help raise and help his family with the loss. He did not seek counseling through his work prefers to do this privately     ...   He plans to retire from [employer] Summer 2020 and work part time as .       Anxiety and depression due to life stressor.      Plan   effexor 37.5 mg once daily  #30  List of support groups for parents who lost children will be given.   Off work for one week.   Fu with Dr. Haynes in one month \"      Anxiety. Improved with Effexor, he will continue.   Grief reaction to loss of his son, he passed away due tooverdose.     Hx mild hyperglycemia, 103 in 2012. NL 2015. NL labs at work in 2019.   FHx DM (both parents).     Overweight. BMI 28. Wt 179 lbs.     Hx IBS, Hx chronic abd pains. Improved with dicyclomine prn. GI follows.   Hx mild sigmoid diverticulosis on colonoscopy 8/2013. NL otherwise.     Hx chronic LBP. Hx herniated disk L4-5 in 2004, s/p MRI, s/p PT, s/p spinal injections. He used either diclofenac or aleve prn.           Other significant problems:  Patient Active Problem List    Diagnosis Date Noted   •  Osteoarthritis of carpometacarpal (CMC) joints of both thumbs 10/08/2019     Priority: Low   • Folliculitis 07/25/2016     Priority: Low   • Major depressive disorder with single episode, in partial remission (CMS/[facility]) 07/25/2016     Priority: Low   • IBS (irritable bowel syndrome) 05/05/2015     Priority: Low   • FH: diabetes mellitus 05/05/2015     Priority: Low   • Acne 05/05/2015     Priority: Low   • Sigmoid diverticulosis 05/05/2015     Priority: Low   • Hyperglycemia 05/05/2015     Priority: Low   • Tendinitis of iliotibial band 08/21/2013     Priority: Low   • Hypercholesterolemia      Priority: Low   • Displacement of lumbar intervertebral disc without myelopathy 06/11/2007     Priority: Low       PAST MEDICAL, FAMILY AND SOCIAL HISTORY     Current Outpatient Medications   Medication Sig Dispense Refill   • celecoxib (CELEBREX) 200 MG capsule TAKE ONE CAPSULE BY MOUTH TWICE DAILY 60 capsule 2   • venlafaxine XR (EFFEXOR XR) 37.5 MG 24 hr capsule Take 1 capsule by mouth daily. 30 capsule 0   • clindamycin (CLINDAGEL) 1 % gel APPLY BID TO NECK/ACNE  3   • RESTASIS 0.05 % ophthalmic emulsion INT 1 GTT IN OU BID  0   • omeprazole (PRILOSEC) 40 MG capsule TK ONE C PO BID  3   • MULTI-VITAMIN PO TABS 1 TABLET DAILY 30 0     No current facility-administered medications for this visit.        Allergies:  ALLERGIES:   Allergen Reactions   • Tussionex Pennkinetic Er [Chlorpheniramine-Hydrocodone] RASH     rash and itching        Past Medical  History/Surgeries:  Past Medical History:   Diagnosis Date   • Degeneration of intervertebral disc, site unspecified    • Gastro-oesophageal reflux disease    • Hoarseness    • Hypercholesterolemia    • Migraine headache    • Negative History of CA, HTN, DM, CAD, CVA, DVT, Asthma    • Negative History of ulcer, renal, liver, thyroid,TB,RF    • Varicella without mention of complication childhood       Past Surgical History:   Procedure Laterality Date   • Lasik surgery  2/25/2005     both eyes   • Past surgical history  8/13/09    Esophagram video with speech - velopharyngeal regurgitation and trace to mild in-and-out laryngeal penetration with thin-consistency per straw and cup - [facility] - for post-tonsillectomy dypshagia   • Removal of sperm duct(s)  2/6/08    Vasectomy   • Removal of tonsils,10+ y/o     • Shldr arthroscop,part acromioplas  10/8/02    Left shoulder arthroscopic subacromial decompression       Family History:  Family History   Problem Relation Age of Onset   • Diabetes Maternal Grandmother    • Hypertension Father    • Diabetes Father    • Other Brother         lupus   • Stroke Paternal Grandmother    • NEGATIVE FAMILY HX OF Paternal Grandfather    • Other Maternal Grandfather         unknown   • Diabetes Mother        Social History:  Social History     Tobacco Use   • Smoking status: Current Some Day Smoker     Packs/day: 0.00     Types: Cigars   • Smokeless tobacco: Never Used   Substance Use Topics   • Alcohol use: Yes     Alcohol/week: 2.5 standard drinks     Types: 3 Standard drinks or equivalent per week     Comment: social        REVIEW OF SYSTEMS     Review of Systems   All other systems reviewed and are negative.      PHYSICAL EXAM     Physical Exam  Vitals signs reviewed.   Constitutional:       General: He is not in acute distress.     Appearance: He is well-developed. He is not diaphoretic.   HENT:      Head: Normocephalic and atraumatic.   Eyes:      General: No scleral icterus.     Conjunctiva/sclera: Conjunctivae normal.      Pupils: Pupils are equal, round, and reactive to light.   Neck:      Musculoskeletal: Neck supple.      Vascular: No JVD.   Cardiovascular:      Rate and Rhythm: Normal rate and regular rhythm.      Heart sounds: No murmur. No gallop.    Pulmonary:      Effort: Pulmonary effort is normal. No respiratory distress.      Breath sounds: Normal breath sounds. No wheezing or rales.   Abdominal:      General: There is no distension.       Palpations: Abdomen is soft. There is no mass.      Tenderness: There is no tenderness. There is no guarding.   Skin:     General: Skin is warm.      Coloration: Skin is not pale.      Findings: No rash.   Neurological:      Mental Status: He is alert and oriented to person, place, and time.   Psychiatric:         Behavior: Behavior normal.       Recentlab tests:   Sodium (mmol/L)   Date Value   05/19/2015 140      Potassium (mmol/L)   Date Value   05/19/2015 4.2      BUN (mg/dL)   Date Value   05/19/2015 13      Creatinine (mg/dL)   Date Value   05/19/2015 0.91     Hemoglobin A1C (%)   Date Value   12/20/2004 4.7      Glucose (mg/dL)   Date Value   05/19/2015 79      No results found for: MALBCR  CHOLESTEROL (mg/dL)   Date Value   05/19/2015 198      HDL (mg/dL)   Date Value   05/19/2015 90     TRIGLYCERIDE (mg/dL)   Date Value   05/19/2015 80      CALCULATED LDL (mg/dL)   Date Value   05/19/2015 92     ALT/SGPT (Units/L)   Date Value   05/19/2015 26      AST/SGOT (Units/L)   Date Value   05/19/2015 25     HGB (g/dL)   Date Value   10/17/2012 14.5      PLT   Date Value   10/17/2012 233 K/mcL   12/20/2004 242 K/uL   ;  TSH (mcUnits/mL)   Date Value   05/19/2015 1.026     No results found for: VITD25        ASSESSMENT/PLAN         Anxiety. Improved with Effexor, he will continue.   Grief reaction to loss of his son, he passed away due tooverdose.     Hx mild hyperglycemia, 103 in 2012. NL 2015. NL labs at work in 2019.     Hx IBS, Hx chronic abd pains. Improved with dicyclomine prn. GI follows.   Hx mild sigmoid diverticulosis on colonoscopy 8/2013. NL otherwise.     Hx chronic LBP. Hx herniated disk L4-5 in 2004, s/p MRI, s/p PT, s/p spinal injections. He used either diclofenac or aleve prn.     Fasting lipids, tsh.

## 2019-12-04 ENCOUNTER — ROUTINE PRENATAL (OUTPATIENT)
Dept: OBSTETRICS AND GYNECOLOGY | Facility: CLINIC | Age: 39
End: 2019-12-04
Payer: COMMERCIAL

## 2019-12-04 VITALS
DIASTOLIC BLOOD PRESSURE: 90 MMHG | BODY MASS INDEX: 30.63 KG/M2 | WEIGHT: 151.69 LBS | SYSTOLIC BLOOD PRESSURE: 148 MMHG

## 2019-12-04 DIAGNOSIS — Z36.3 ANTENATAL SCREENING FOR MALFORMATION USING ULTRASONICS: ICD-10-CM

## 2019-12-04 DIAGNOSIS — E03.9 HYPOTHYROIDISM, UNSPECIFIED TYPE: ICD-10-CM

## 2019-12-04 DIAGNOSIS — O09.522 MULTIGRAVIDA OF ADVANCED MATERNAL AGE IN SECOND TRIMESTER: Primary | ICD-10-CM

## 2019-12-04 PROCEDURE — 99999 PR PBB SHADOW E&M-EST. PATIENT-LVL III: CPT | Mod: PBBFAC,,, | Performed by: OBSTETRICS & GYNECOLOGY

## 2019-12-04 PROCEDURE — 0502F PR SUBSEQUENT PRENATAL CARE: ICD-10-PCS | Mod: S$GLB,,, | Performed by: OBSTETRICS & GYNECOLOGY

## 2019-12-04 PROCEDURE — 0502F SUBSEQUENT PRENATAL CARE: CPT | Mod: S$GLB,,, | Performed by: OBSTETRICS & GYNECOLOGY

## 2019-12-04 PROCEDURE — 99999 PR PBB SHADOW E&M-EST. PATIENT-LVL III: ICD-10-PCS | Mod: PBBFAC,,, | Performed by: OBSTETRICS & GYNECOLOGY

## 2019-12-04 RX ORDER — LABETALOL 200 MG/1
200 TABLET, FILM COATED ORAL 2 TIMES DAILY
Qty: 60 TABLET | Refills: 11 | Status: SHIPPED | OUTPATIENT
Start: 2019-12-04 | End: 2020-05-15

## 2019-12-04 RX ORDER — LEVOTHYROXINE SODIUM 125 UG/1
125 TABLET ORAL DAILY
Qty: 30 TABLET | Refills: 11 | Status: SHIPPED | OUTPATIENT
Start: 2019-12-04 | End: 2020-12-28 | Stop reason: SDUPTHER

## 2019-12-04 NOTE — PROGRESS NOTES
Spoke with her , patient will be covered for maternity 21, weekly BPP after 32 weeks and NST's if possible, covered for anatomy ultrasound. Patient has a lot of work stress.  Repeat /92, will increase labetalol to 200 mg  BID.  Synthroid increased secondary to increased TSH, recommendation by endocrinologist.  RTO 4 week.  Hypertension precautions given.

## 2019-12-04 NOTE — PATIENT INSTRUCTIONS
Pregnancy: Your Second Trimester Changes    Each day, you and your baby are changing and growing together. Heres a quick look at whats happening to both of you.  How You Are Changing  Even when you dont notice it, your body is adapting to meet the needs of your growing baby. The changes in your body might also affect your moods.  Your body  Your uterus expands as baby grows. As the weeks go by, you will feel more pressure on your bladder, stomach, and other organs. You may notice some skin color changes on your forehead, nose, or cheeks. Freckles may darken, and moles may grow. You may notice a darker line on your abdomen between your belly button and pubic bone in the midline.  Your moods  The second trimester is often easier than the first. Still, be prepared for mood swings. These are due to the increase in hormones (chemicals that affect the way organs work) produced by your body. These mood swings are a normal part of pregnancy.  How your baby is growing       Month 4  Babys heartbeat may be heard with a Doppler (hand-held ultrasound device) by 9 to 10 weeks. Eyebrows, eyelashes and fingernails begin to form.  Month 5  You may feel your baby move. After a growth spurt, your baby nears 10 inches. Month 6  Babys fingerprints have formed. Your baby weighs about 1  to 2 pounds and is about 12 inches long.   Date Last Reviewed: 8/16/2015  © 2334-9822 [employer]. [address] [place] [address], [place] [address]. All rights reserved. This information is not intended as a substitute for professional medical care. Always follow your healthcare professional's instructions.

## 2019-12-15 ENCOUNTER — PATIENT MESSAGE (OUTPATIENT)
Dept: OBSTETRICS AND GYNECOLOGY | Facility: CLINIC | Age: 39
End: 2019-12-15

## 2019-12-16 ENCOUNTER — ROUTINE PRENATAL (OUTPATIENT)
Dept: OBSTETRICS AND GYNECOLOGY | Facility: CLINIC | Age: 39
End: 2019-12-16
Payer: COMMERCIAL

## 2019-12-16 VITALS
WEIGHT: 151.44 LBS | BODY MASS INDEX: 30.59 KG/M2 | DIASTOLIC BLOOD PRESSURE: 78 MMHG | SYSTOLIC BLOOD PRESSURE: 140 MMHG

## 2019-12-16 DIAGNOSIS — O09.522 MULTIGRAVIDA OF ADVANCED MATERNAL AGE IN SECOND TRIMESTER: Primary | ICD-10-CM

## 2019-12-16 DIAGNOSIS — N30.90 CYSTITIS: ICD-10-CM

## 2019-12-16 PROCEDURE — 0502F PR SUBSEQUENT PRENATAL CARE: ICD-10-PCS | Mod: S$GLB,,, | Performed by: OBSTETRICS & GYNECOLOGY

## 2019-12-16 PROCEDURE — 87086 URINE CULTURE/COLONY COUNT: CPT

## 2019-12-16 PROCEDURE — 0502F SUBSEQUENT PRENATAL CARE: CPT | Mod: S$GLB,,, | Performed by: OBSTETRICS & GYNECOLOGY

## 2019-12-16 PROCEDURE — 99999 PR PBB SHADOW E&M-EST. PATIENT-LVL II: CPT | Mod: PBBFAC,,, | Performed by: OBSTETRICS & GYNECOLOGY

## 2019-12-16 PROCEDURE — 99999 PR PBB SHADOW E&M-EST. PATIENT-LVL II: ICD-10-PCS | Mod: PBBFAC,,, | Performed by: OBSTETRICS & GYNECOLOGY

## 2019-12-16 RX ORDER — NITROFURANTOIN 25; 75 MG/1; MG/1
CAPSULE ORAL
COMMUNITY
Start: 2019-12-14 | End: 2020-01-03

## 2019-12-16 NOTE — PATIENT INSTRUCTIONS

## 2019-12-16 NOTE — PROGRESS NOTES
Blood pressure is better,  Patient was started on macrobid on Saturday.  Patient saw some blood in her urine this am, then it stopped.  Pain is better. No jitterness.  Taking increased dosages on thyroid and blood pressure medication.

## 2019-12-17 LAB — BACTERIA UR CULT: NO GROWTH

## 2019-12-28 ENCOUNTER — LAB VISIT (OUTPATIENT)
Dept: LAB | Facility: HOSPITAL | Age: 39
End: 2019-12-28
Attending: OBSTETRICS & GYNECOLOGY
Payer: COMMERCIAL

## 2019-12-28 DIAGNOSIS — E03.9 HYPOTHYROIDISM, UNSPECIFIED TYPE: ICD-10-CM

## 2019-12-28 LAB
T4 FREE SERPL-MCNC: 0.95 NG/DL (ref 0.71–1.51)
TSH SERPL DL<=0.005 MIU/L-ACNC: 4.11 UIU/ML (ref 0.4–4)

## 2019-12-28 PROCEDURE — 84439 ASSAY OF FREE THYROXINE: CPT

## 2019-12-28 PROCEDURE — 84443 ASSAY THYROID STIM HORMONE: CPT

## 2019-12-28 PROCEDURE — 36415 COLL VENOUS BLD VENIPUNCTURE: CPT

## 2020-01-03 ENCOUNTER — ROUTINE PRENATAL (OUTPATIENT)
Dept: OBSTETRICS AND GYNECOLOGY | Facility: CLINIC | Age: 40
End: 2020-01-03
Payer: COMMERCIAL

## 2020-01-03 ENCOUNTER — PROCEDURE VISIT (OUTPATIENT)
Dept: OBSTETRICS AND GYNECOLOGY | Facility: CLINIC | Age: 40
End: 2020-01-03
Payer: COMMERCIAL

## 2020-01-03 VITALS
BODY MASS INDEX: 31.17 KG/M2 | WEIGHT: 154.31 LBS | SYSTOLIC BLOOD PRESSURE: 132 MMHG | DIASTOLIC BLOOD PRESSURE: 88 MMHG

## 2020-01-03 DIAGNOSIS — Z36.3 ANTENATAL SCREENING FOR MALFORMATION USING ULTRASONICS: ICD-10-CM

## 2020-01-03 DIAGNOSIS — O09.529 ELDERLY MULTIGRAVIDA, CURRENTLY PREGNANT: Primary | ICD-10-CM

## 2020-01-03 DIAGNOSIS — I10 ESSENTIAL HYPERTENSION: ICD-10-CM

## 2020-01-03 PROCEDURE — 0502F SUBSEQUENT PRENATAL CARE: CPT | Mod: S$GLB,,, | Performed by: ADVANCED PRACTICE MIDWIFE

## 2020-01-03 PROCEDURE — 99999 PR PBB SHADOW E&M-EST. PATIENT-LVL II: CPT | Mod: PBBFAC,,, | Performed by: ADVANCED PRACTICE MIDWIFE

## 2020-01-03 PROCEDURE — 76805 OB US >/= 14 WKS SNGL FETUS: CPT | Mod: S$GLB,,, | Performed by: OBSTETRICS & GYNECOLOGY

## 2020-01-03 PROCEDURE — 0502F PR SUBSEQUENT PRENATAL CARE: ICD-10-PCS | Mod: S$GLB,,, | Performed by: ADVANCED PRACTICE MIDWIFE

## 2020-01-03 PROCEDURE — 76805 PR US, OB 14+WKS, TRANSABD, SINGLE GESTATION: ICD-10-PCS | Mod: S$GLB,,, | Performed by: OBSTETRICS & GYNECOLOGY

## 2020-01-03 PROCEDURE — 99999 PR PBB SHADOW E&M-EST. PATIENT-LVL II: ICD-10-PCS | Mod: PBBFAC,,, | Performed by: ADVANCED PRACTICE MIDWIFE

## 2020-01-03 NOTE — PROGRESS NOTES
Patient reports doing well today without concerns.  Reports feeling baby move  Common discomforts of pregnancy reviewed  Reports taking Labetalol twice a day, and takes b/p at home some. Reports normal ranges when at home.  Patient given a copy of Maternity 21 results (Negative)  US today: placenta anterior, 3VC, 10oz fetus(12%), suboptimal views - discussed with patient     Coffective counseling sheet Learn Your Baby discussed with mother. Instructed regarding feeding cues and methods to calm baby. Encouraged mother to download Coffective mobile virgil if she has not already done so.  Mother verbalized understanding.     SUSAN Clark

## 2020-01-31 ENCOUNTER — ROUTINE PRENATAL (OUTPATIENT)
Dept: OBSTETRICS AND GYNECOLOGY | Facility: CLINIC | Age: 40
End: 2020-01-31
Payer: COMMERCIAL

## 2020-01-31 ENCOUNTER — PATIENT MESSAGE (OUTPATIENT)
Dept: OBSTETRICS AND GYNECOLOGY | Facility: CLINIC | Age: 40
End: 2020-01-31

## 2020-01-31 ENCOUNTER — PROCEDURE VISIT (OUTPATIENT)
Dept: OBSTETRICS AND GYNECOLOGY | Facility: CLINIC | Age: 40
End: 2020-01-31
Payer: COMMERCIAL

## 2020-01-31 VITALS — WEIGHT: 156.5 LBS | DIASTOLIC BLOOD PRESSURE: 90 MMHG | BODY MASS INDEX: 31.61 KG/M2 | SYSTOLIC BLOOD PRESSURE: 144 MMHG

## 2020-01-31 DIAGNOSIS — O09.92 SUPERVISION OF HIGH RISK PREGNANCY IN SECOND TRIMESTER: ICD-10-CM

## 2020-01-31 DIAGNOSIS — O10.012 PRE-EXISTING ESSENTIAL HYPERTENSION DURING PREGNANCY IN SECOND TRIMESTER: Primary | ICD-10-CM

## 2020-01-31 DIAGNOSIS — O09.522 AMA (ADVANCED MATERNAL AGE) MULTIGRAVIDA 35+, SECOND TRIMESTER: ICD-10-CM

## 2020-01-31 PROCEDURE — 99999 PR PBB SHADOW E&M-EST. PATIENT-LVL II: CPT | Mod: PBBFAC,,, | Performed by: ADVANCED PRACTICE MIDWIFE

## 2020-01-31 PROCEDURE — 99999 PR PBB SHADOW E&M-EST. PATIENT-LVL II: ICD-10-PCS | Mod: PBBFAC,,, | Performed by: ADVANCED PRACTICE MIDWIFE

## 2020-01-31 PROCEDURE — 76816 PR  US,PREGNANT UTERUS,F/U,TRANSABD APP: ICD-10-PCS | Mod: S$GLB,,, | Performed by: OBSTETRICS & GYNECOLOGY

## 2020-01-31 PROCEDURE — 82570 ASSAY OF URINE CREATININE: CPT

## 2020-01-31 PROCEDURE — 76816 OB US FOLLOW-UP PER FETUS: CPT | Mod: S$GLB,,, | Performed by: OBSTETRICS & GYNECOLOGY

## 2020-01-31 PROCEDURE — 0502F SUBSEQUENT PRENATAL CARE: CPT | Mod: S$GLB,,, | Performed by: ADVANCED PRACTICE MIDWIFE

## 2020-01-31 PROCEDURE — 0502F PR SUBSEQUENT PRENATAL CARE: ICD-10-PCS | Mod: S$GLB,,, | Performed by: ADVANCED PRACTICE MIDWIFE

## 2020-02-01 LAB
CREAT UR-MCNC: 133 MG/DL (ref 15–325)
PROT UR-MCNC: 11 MG/DL (ref 0–15)
PROT/CREAT UR: 0.08 MG/G{CREAT} (ref 0–0.2)

## 2020-02-03 ENCOUNTER — TELEPHONE (OUTPATIENT)
Dept: OBSTETRICS AND GYNECOLOGY | Facility: CLINIC | Age: 40
End: 2020-02-03

## 2020-02-03 ENCOUNTER — PATIENT MESSAGE (OUTPATIENT)
Dept: OBSTETRICS AND GYNECOLOGY | Facility: CLINIC | Age: 40
End: 2020-02-03

## 2020-03-04 ENCOUNTER — PATIENT OUTREACH (OUTPATIENT)
Dept: ADMINISTRATIVE | Facility: OTHER | Age: 40
End: 2020-03-04

## 2020-03-04 DIAGNOSIS — Z12.31 ENCOUNTER FOR SCREENING MAMMOGRAM FOR MALIGNANT NEOPLASM OF BREAST: Primary | ICD-10-CM

## 2020-03-06 ENCOUNTER — ROUTINE PRENATAL (OUTPATIENT)
Dept: OBSTETRICS AND GYNECOLOGY | Facility: CLINIC | Age: 40
End: 2020-03-06
Payer: COMMERCIAL

## 2020-03-06 ENCOUNTER — LAB VISIT (OUTPATIENT)
Dept: LAB | Facility: HOSPITAL | Age: 40
End: 2020-03-06
Attending: ADVANCED PRACTICE MIDWIFE
Payer: COMMERCIAL

## 2020-03-06 VITALS — DIASTOLIC BLOOD PRESSURE: 72 MMHG | SYSTOLIC BLOOD PRESSURE: 132 MMHG | BODY MASS INDEX: 32.42 KG/M2 | WEIGHT: 160.5 LBS

## 2020-03-06 DIAGNOSIS — O09.93 SUPERVISION OF HIGH RISK PREGNANCY IN THIRD TRIMESTER: ICD-10-CM

## 2020-03-06 DIAGNOSIS — O10.019 PRE-EXISTING ESSENTIAL HYPERTENSION DURING PREGNANCY, ANTEPARTUM: ICD-10-CM

## 2020-03-06 DIAGNOSIS — O10.012 PRE-EXISTING ESSENTIAL HYPERTENSION DURING PREGNANCY IN SECOND TRIMESTER: ICD-10-CM

## 2020-03-06 DIAGNOSIS — O09.92 SUPERVISION OF HIGH RISK PREGNANCY IN SECOND TRIMESTER: ICD-10-CM

## 2020-03-06 DIAGNOSIS — O09.522 AMA (ADVANCED MATERNAL AGE) MULTIGRAVIDA 35+, SECOND TRIMESTER: Primary | ICD-10-CM

## 2020-03-06 LAB
ALBUMIN SERPL BCP-MCNC: 2.8 G/DL (ref 3.5–5.2)
ALP SERPL-CCNC: 74 U/L (ref 55–135)
ALT SERPL W/O P-5'-P-CCNC: 16 U/L (ref 10–44)
ANION GAP SERPL CALC-SCNC: 9 MMOL/L (ref 8–16)
AST SERPL-CCNC: 19 U/L (ref 10–40)
BASOPHILS # BLD AUTO: 0.04 K/UL (ref 0–0.2)
BASOPHILS NFR BLD: 0.3 % (ref 0–1.9)
BILIRUB SERPL-MCNC: 0.3 MG/DL (ref 0.1–1)
BUN SERPL-MCNC: 10 MG/DL (ref 6–20)
CALCIUM SERPL-MCNC: 8.9 MG/DL (ref 8.7–10.5)
CHLORIDE SERPL-SCNC: 105 MMOL/L (ref 95–110)
CO2 SERPL-SCNC: 21 MMOL/L (ref 23–29)
CREAT SERPL-MCNC: 0.7 MG/DL (ref 0.5–1.4)
DIFFERENTIAL METHOD: ABNORMAL
EOSINOPHIL # BLD AUTO: 0.2 K/UL (ref 0–0.5)
EOSINOPHIL NFR BLD: 1.5 % (ref 0–8)
ERYTHROCYTE [DISTWIDTH] IN BLOOD BY AUTOMATED COUNT: 13.2 % (ref 11.5–14.5)
EST. GFR  (AFRICAN AMERICAN): >60 ML/MIN/1.73 M^2
EST. GFR  (NON AFRICAN AMERICAN): >60 ML/MIN/1.73 M^2
GLUCOSE SERPL-MCNC: 157 MG/DL (ref 70–110)
HCT VFR BLD AUTO: 31.9 % (ref 37–48.5)
HGB BLD-MCNC: 10.1 G/DL (ref 12–16)
IMM GRANULOCYTES # BLD AUTO: 0.19 K/UL (ref 0–0.04)
IMM GRANULOCYTES NFR BLD AUTO: 1.6 % (ref 0–0.5)
LYMPHOCYTES # BLD AUTO: 1.6 K/UL (ref 1–4.8)
LYMPHOCYTES NFR BLD: 13.7 % (ref 18–48)
MCH RBC QN AUTO: 30.6 PG (ref 27–31)
MCHC RBC AUTO-ENTMCNC: 31.7 G/DL (ref 32–36)
MCV RBC AUTO: 97 FL (ref 82–98)
MONOCYTES # BLD AUTO: 1 K/UL (ref 0.3–1)
MONOCYTES NFR BLD: 8.2 % (ref 4–15)
NEUTROPHILS # BLD AUTO: 8.7 K/UL (ref 1.8–7.7)
NEUTROPHILS NFR BLD: 74.7 % (ref 38–73)
NRBC BLD-RTO: 0 /100 WBC
PLATELET # BLD AUTO: 249 K/UL (ref 150–350)
PMV BLD AUTO: 10.3 FL (ref 9.2–12.9)
POTASSIUM SERPL-SCNC: 3.6 MMOL/L (ref 3.5–5.1)
PROT SERPL-MCNC: 7 G/DL (ref 6–8.4)
RBC # BLD AUTO: 3.3 M/UL (ref 4–5.4)
SODIUM SERPL-SCNC: 135 MMOL/L (ref 136–145)
T4 FREE SERPL-MCNC: 0.86 NG/DL (ref 0.71–1.51)
TSH SERPL DL<=0.005 MIU/L-ACNC: 1.52 UIU/ML (ref 0.4–4)
WBC # BLD AUTO: 11.65 K/UL (ref 3.9–12.7)

## 2020-03-06 PROCEDURE — 84439 ASSAY OF FREE THYROXINE: CPT

## 2020-03-06 PROCEDURE — 0502F SUBSEQUENT PRENATAL CARE: CPT | Mod: S$GLB,,, | Performed by: ADVANCED PRACTICE MIDWIFE

## 2020-03-06 PROCEDURE — 36415 COLL VENOUS BLD VENIPUNCTURE: CPT

## 2020-03-06 PROCEDURE — 99999 PR PBB SHADOW E&M-EST. PATIENT-LVL II: CPT | Mod: PBBFAC,,, | Performed by: ADVANCED PRACTICE MIDWIFE

## 2020-03-06 PROCEDURE — 84443 ASSAY THYROID STIM HORMONE: CPT

## 2020-03-06 PROCEDURE — 99999 PR PBB SHADOW E&M-EST. PATIENT-LVL II: ICD-10-PCS | Mod: PBBFAC,,, | Performed by: ADVANCED PRACTICE MIDWIFE

## 2020-03-06 PROCEDURE — 86703 HIV-1/HIV-2 1 RESULT ANTBDY: CPT

## 2020-03-06 PROCEDURE — 82950 GLUCOSE TEST: CPT

## 2020-03-06 PROCEDURE — 0502F PR SUBSEQUENT PRENATAL CARE: ICD-10-PCS | Mod: S$GLB,,, | Performed by: ADVANCED PRACTICE MIDWIFE

## 2020-03-06 PROCEDURE — 85025 COMPLETE CBC W/AUTO DIFF WBC: CPT

## 2020-03-06 PROCEDURE — 80053 COMPREHEN METABOLIC PANEL: CPT

## 2020-03-06 PROCEDURE — 86592 SYPHILIS TEST NON-TREP QUAL: CPT

## 2020-03-06 NOTE — PROGRESS NOTES
Checking BP at [id number] 115/70  Taking labatelol 200mg 2xd  Needs to do 28 week labs with thyroid today  Sandra counts baby active  Planning a trip to [place] next week  Sandra counts baby active  Begin BPP at 32 weeks

## 2020-03-07 LAB
GLUCOSE SERPL-MCNC: 153 MG/DL (ref 70–140)
RPR SER QL: NORMAL

## 2020-03-09 ENCOUNTER — PATIENT MESSAGE (OUTPATIENT)
Dept: OBSTETRICS AND GYNECOLOGY | Facility: CLINIC | Age: 40
End: 2020-03-09

## 2020-03-09 DIAGNOSIS — O99.810 ABNORMAL GLUCOSE IN PREGNANCY, ANTEPARTUM: Primary | ICD-10-CM

## 2020-03-09 LAB — HIV 1+2 AB+HIV1 P24 AG SERPL QL IA: NEGATIVE

## 2020-03-13 ENCOUNTER — LAB VISIT (OUTPATIENT)
Dept: LAB | Facility: HOSPITAL | Age: 40
End: 2020-03-13
Attending: ADVANCED PRACTICE MIDWIFE
Payer: COMMERCIAL

## 2020-03-13 DIAGNOSIS — O99.810 ABNORMAL GLUCOSE IN PREGNANCY, ANTEPARTUM: ICD-10-CM

## 2020-03-13 LAB
GLUCOSE SERPL-MCNC: 120 MG/DL
GLUCOSE SERPL-MCNC: 138 MG/DL
GLUCOSE SERPL-MCNC: 170 MG/DL
GLUCOSE SERPL-MCNC: 80 MG/DL (ref 70–110)

## 2020-03-13 PROCEDURE — 36415 COLL VENOUS BLD VENIPUNCTURE: CPT

## 2020-03-13 PROCEDURE — 82951 GLUCOSE TOLERANCE TEST (GTT): CPT

## 2020-03-13 PROCEDURE — 82952 GTT-ADDED SAMPLES: CPT

## 2020-03-19 ENCOUNTER — PATIENT OUTREACH (OUTPATIENT)
Dept: ADMINISTRATIVE | Facility: OTHER | Age: 40
End: 2020-03-19

## 2020-03-19 ENCOUNTER — PATIENT MESSAGE (OUTPATIENT)
Dept: OBSTETRICS AND GYNECOLOGY | Facility: CLINIC | Age: 40
End: 2020-03-19

## 2020-03-20 ENCOUNTER — PATIENT MESSAGE (OUTPATIENT)
Dept: ADMINISTRATIVE | Facility: OTHER | Age: 40
End: 2020-03-20

## 2020-03-20 ENCOUNTER — ROUTINE PRENATAL (OUTPATIENT)
Dept: OBSTETRICS AND GYNECOLOGY | Facility: CLINIC | Age: 40
End: 2020-03-20
Payer: COMMERCIAL

## 2020-03-20 VITALS — SYSTOLIC BLOOD PRESSURE: 146 MMHG | WEIGHT: 162.5 LBS | BODY MASS INDEX: 32.82 KG/M2 | DIASTOLIC BLOOD PRESSURE: 84 MMHG

## 2020-03-20 DIAGNOSIS — O10.013 PRE-EXISTING ESSENTIAL HYPERTENSION DURING PREGNANCY IN THIRD TRIMESTER: ICD-10-CM

## 2020-03-20 DIAGNOSIS — O09.93 SUPERVISION OF HIGH RISK PREGNANCY IN THIRD TRIMESTER: ICD-10-CM

## 2020-03-20 DIAGNOSIS — O09.522 AMA (ADVANCED MATERNAL AGE) MULTIGRAVIDA 35+, SECOND TRIMESTER: Primary | ICD-10-CM

## 2020-03-20 PROCEDURE — 99999 PR PBB SHADOW E&M-EST. PATIENT-LVL II: ICD-10-PCS | Mod: PBBFAC,,, | Performed by: ADVANCED PRACTICE MIDWIFE

## 2020-03-20 PROCEDURE — 90715 TDAP VACCINE GREATER THAN OR EQUAL TO 7YO IM: ICD-10-PCS | Mod: S$GLB,,, | Performed by: OBSTETRICS & GYNECOLOGY

## 2020-03-20 PROCEDURE — 0502F SUBSEQUENT PRENATAL CARE: CPT | Mod: S$GLB,,, | Performed by: ADVANCED PRACTICE MIDWIFE

## 2020-03-20 PROCEDURE — 90471 TDAP VACCINE GREATER THAN OR EQUAL TO 7YO IM: ICD-10-PCS | Mod: S$GLB,,, | Performed by: OBSTETRICS & GYNECOLOGY

## 2020-03-20 PROCEDURE — 0502F PR SUBSEQUENT PRENATAL CARE: ICD-10-PCS | Mod: S$GLB,,, | Performed by: ADVANCED PRACTICE MIDWIFE

## 2020-03-20 PROCEDURE — 99999 PR PBB SHADOW E&M-EST. PATIENT-LVL II: CPT | Mod: PBBFAC,,, | Performed by: ADVANCED PRACTICE MIDWIFE

## 2020-03-20 PROCEDURE — 90471 IMMUNIZATION ADMIN: CPT | Mod: S$GLB,,, | Performed by: OBSTETRICS & GYNECOLOGY

## 2020-03-20 PROCEDURE — 90715 TDAP VACCINE 7 YRS/> IM: CPT | Mod: S$GLB,,, | Performed by: OBSTETRICS & GYNECOLOGY

## 2020-03-20 NOTE — NURSING
Verified pt with two identifiers name and . Allergies and medications reviewed.TDAP administered IM to right deltoid while using aseptic technique. No discomfort noted. Pt tolerated well. Advised pt to wait 15 minutes in the lobby to monitor for side effects. Pt verbalized understanding.

## 2020-04-03 ENCOUNTER — PATIENT OUTREACH (OUTPATIENT)
Dept: ADMINISTRATIVE | Facility: OTHER | Age: 40
End: 2020-04-03

## 2020-04-03 ENCOUNTER — ROUTINE PRENATAL (OUTPATIENT)
Dept: OBSTETRICS AND GYNECOLOGY | Facility: CLINIC | Age: 40
End: 2020-04-03
Payer: COMMERCIAL

## 2020-04-03 ENCOUNTER — PROCEDURE VISIT (OUTPATIENT)
Dept: OBSTETRICS AND GYNECOLOGY | Facility: CLINIC | Age: 40
End: 2020-04-03
Payer: COMMERCIAL

## 2020-04-03 VITALS
DIASTOLIC BLOOD PRESSURE: 88 MMHG | WEIGHT: 163.81 LBS | BODY MASS INDEX: 33.08 KG/M2 | SYSTOLIC BLOOD PRESSURE: 142 MMHG

## 2020-04-03 DIAGNOSIS — O09.93 SUPERVISION OF HIGH RISK PREGNANCY IN THIRD TRIMESTER: Primary | ICD-10-CM

## 2020-04-03 DIAGNOSIS — O09.522 AMA (ADVANCED MATERNAL AGE) MULTIGRAVIDA 35+, SECOND TRIMESTER: ICD-10-CM

## 2020-04-03 DIAGNOSIS — O10.013 PRE-EXISTING ESSENTIAL HYPERTENSION DURING PREGNANCY IN THIRD TRIMESTER: ICD-10-CM

## 2020-04-03 DIAGNOSIS — O09.529 ELDERLY MULTIGRAVIDA, CURRENTLY PREGNANT: ICD-10-CM

## 2020-04-03 DIAGNOSIS — O09.523 AMA (ADVANCED MATERNAL AGE) MULTIGRAVIDA 35+, THIRD TRIMESTER: ICD-10-CM

## 2020-04-03 PROCEDURE — 76816 PR  US,PREGNANT UTERUS,F/U,TRANSABD APP: ICD-10-PCS | Mod: S$GLB,,, | Performed by: OBSTETRICS & GYNECOLOGY

## 2020-04-03 PROCEDURE — 99999 PR PBB SHADOW E&M-EST. PATIENT-LVL II: ICD-10-PCS | Mod: PBBFAC,,, | Performed by: ADVANCED PRACTICE MIDWIFE

## 2020-04-03 PROCEDURE — 76819 PR US, OB, FETAL BIOPHYSICAL, W/O NST: ICD-10-PCS | Mod: S$GLB,,, | Performed by: OBSTETRICS & GYNECOLOGY

## 2020-04-03 PROCEDURE — 76816 OB US FOLLOW-UP PER FETUS: CPT | Mod: S$GLB,,, | Performed by: OBSTETRICS & GYNECOLOGY

## 2020-04-03 PROCEDURE — 0502F SUBSEQUENT PRENATAL CARE: CPT | Mod: S$GLB,,, | Performed by: ADVANCED PRACTICE MIDWIFE

## 2020-04-03 PROCEDURE — 76819 FETAL BIOPHYS PROFIL W/O NST: CPT | Mod: S$GLB,,, | Performed by: OBSTETRICS & GYNECOLOGY

## 2020-04-03 PROCEDURE — 99999 PR PBB SHADOW E&M-EST. PATIENT-LVL II: CPT | Mod: PBBFAC,,, | Performed by: ADVANCED PRACTICE MIDWIFE

## 2020-04-03 PROCEDURE — 0502F PR SUBSEQUENT PRENATAL CARE: ICD-10-PCS | Mod: S$GLB,,, | Performed by: ADVANCED PRACTICE MIDWIFE

## 2020-04-03 NOTE — PROGRESS NOTES
sono done MVP 5.7 EFW 4#5oz BPP 8/8 17%  Kick counts baby active  Has not received connected MOM's  Weekly BPP

## 2020-04-08 ENCOUNTER — PROCEDURE VISIT (OUTPATIENT)
Dept: OBSTETRICS AND GYNECOLOGY | Facility: CLINIC | Age: 40
End: 2020-04-08
Payer: COMMERCIAL

## 2020-04-08 ENCOUNTER — ROUTINE PRENATAL (OUTPATIENT)
Dept: OBSTETRICS AND GYNECOLOGY | Facility: CLINIC | Age: 40
End: 2020-04-08
Payer: COMMERCIAL

## 2020-04-08 VITALS — BODY MASS INDEX: 33.4 KG/M2 | WEIGHT: 165.38 LBS | SYSTOLIC BLOOD PRESSURE: 124 MMHG | DIASTOLIC BLOOD PRESSURE: 86 MMHG

## 2020-04-08 DIAGNOSIS — O10.013 PRE-EXISTING ESSENTIAL HYPERTENSION DURING PREGNANCY IN THIRD TRIMESTER: ICD-10-CM

## 2020-04-08 DIAGNOSIS — O09.522 AMA (ADVANCED MATERNAL AGE) MULTIGRAVIDA 35+, SECOND TRIMESTER: ICD-10-CM

## 2020-04-08 DIAGNOSIS — O09.93 SUPERVISION OF HIGH RISK PREGNANCY IN THIRD TRIMESTER: ICD-10-CM

## 2020-04-08 DIAGNOSIS — O10.019 PRE-EXISTING ESSENTIAL HYPERTENSION DURING PREGNANCY, ANTEPARTUM: ICD-10-CM

## 2020-04-08 DIAGNOSIS — O09.522 AMA (ADVANCED MATERNAL AGE) MULTIGRAVIDA 35+, SECOND TRIMESTER: Primary | ICD-10-CM

## 2020-04-08 PROCEDURE — 0502F PR SUBSEQUENT PRENATAL CARE: ICD-10-PCS | Mod: S$GLB,,, | Performed by: ADVANCED PRACTICE MIDWIFE

## 2020-04-08 PROCEDURE — 0502F SUBSEQUENT PRENATAL CARE: CPT | Mod: S$GLB,,, | Performed by: ADVANCED PRACTICE MIDWIFE

## 2020-04-08 PROCEDURE — 76819 FETAL BIOPHYS PROFIL W/O NST: CPT | Mod: S$GLB,,, | Performed by: OBSTETRICS & GYNECOLOGY

## 2020-04-08 PROCEDURE — 99999 PR PBB SHADOW E&M-EST. PATIENT-LVL II: CPT | Mod: PBBFAC,,, | Performed by: ADVANCED PRACTICE MIDWIFE

## 2020-04-08 PROCEDURE — 99999 PR PBB SHADOW E&M-EST. PATIENT-LVL II: ICD-10-PCS | Mod: PBBFAC,,, | Performed by: ADVANCED PRACTICE MIDWIFE

## 2020-04-08 PROCEDURE — 76819 PR US, OB, FETAL BIOPHYSICAL, W/O NST: ICD-10-PCS | Mod: S$GLB,,, | Performed by: OBSTETRICS & GYNECOLOGY

## 2020-04-17 ENCOUNTER — ROUTINE PRENATAL (OUTPATIENT)
Dept: OBSTETRICS AND GYNECOLOGY | Facility: CLINIC | Age: 40
End: 2020-04-17
Payer: COMMERCIAL

## 2020-04-17 ENCOUNTER — PROCEDURE VISIT (OUTPATIENT)
Dept: OBSTETRICS AND GYNECOLOGY | Facility: CLINIC | Age: 40
End: 2020-04-17
Payer: COMMERCIAL

## 2020-04-17 ENCOUNTER — PATIENT OUTREACH (OUTPATIENT)
Dept: ADMINISTRATIVE | Facility: OTHER | Age: 40
End: 2020-04-17

## 2020-04-17 VITALS — DIASTOLIC BLOOD PRESSURE: 78 MMHG | WEIGHT: 166 LBS | BODY MASS INDEX: 33.53 KG/M2 | SYSTOLIC BLOOD PRESSURE: 122 MMHG

## 2020-04-17 DIAGNOSIS — O09.522 AMA (ADVANCED MATERNAL AGE) MULTIGRAVIDA 35+, SECOND TRIMESTER: ICD-10-CM

## 2020-04-17 DIAGNOSIS — O09.529 ELDERLY MULTIGRAVIDA, CURRENTLY PREGNANT: ICD-10-CM

## 2020-04-17 DIAGNOSIS — O09.93 SUPERVISION OF HIGH RISK PREGNANCY IN THIRD TRIMESTER: ICD-10-CM

## 2020-04-17 DIAGNOSIS — O10.013 PRE-EXISTING ESSENTIAL HYPERTENSION DURING PREGNANCY IN THIRD TRIMESTER: ICD-10-CM

## 2020-04-17 DIAGNOSIS — O10.019 PRE-EXISTING ESSENTIAL HYPERTENSION DURING PREGNANCY, ANTEPARTUM: Primary | ICD-10-CM

## 2020-04-17 PROCEDURE — 76819 PR US, OB, FETAL BIOPHYSICAL, W/O NST: ICD-10-PCS | Mod: S$GLB,,, | Performed by: OBSTETRICS & GYNECOLOGY

## 2020-04-17 PROCEDURE — 99999 PR PBB SHADOW E&M-EST. PATIENT-LVL III: ICD-10-PCS | Mod: PBBFAC,,, | Performed by: ADVANCED PRACTICE MIDWIFE

## 2020-04-17 PROCEDURE — 99999 PR PBB SHADOW E&M-EST. PATIENT-LVL III: CPT | Mod: PBBFAC,,, | Performed by: ADVANCED PRACTICE MIDWIFE

## 2020-04-17 PROCEDURE — 0502F PR SUBSEQUENT PRENATAL CARE: ICD-10-PCS | Mod: S$GLB,,, | Performed by: ADVANCED PRACTICE MIDWIFE

## 2020-04-17 PROCEDURE — 76819 FETAL BIOPHYS PROFIL W/O NST: CPT | Mod: S$GLB,,, | Performed by: OBSTETRICS & GYNECOLOGY

## 2020-04-17 PROCEDURE — 0502F SUBSEQUENT PRENATAL CARE: CPT | Mod: S$GLB,,, | Performed by: ADVANCED PRACTICE MIDWIFE

## 2020-04-23 ENCOUNTER — PATIENT OUTREACH (OUTPATIENT)
Dept: ADMINISTRATIVE | Facility: OTHER | Age: 40
End: 2020-04-23

## 2020-04-24 ENCOUNTER — PROCEDURE VISIT (OUTPATIENT)
Dept: OBSTETRICS AND GYNECOLOGY | Facility: CLINIC | Age: 40
End: 2020-04-24
Payer: COMMERCIAL

## 2020-04-24 ENCOUNTER — ROUTINE PRENATAL (OUTPATIENT)
Dept: OBSTETRICS AND GYNECOLOGY | Facility: CLINIC | Age: 40
End: 2020-04-24
Payer: COMMERCIAL

## 2020-04-24 ENCOUNTER — HOSPITAL ENCOUNTER (OUTPATIENT)
Facility: HOSPITAL | Age: 40
Discharge: HOME | End: 2020-04-24
Attending: OBSTETRICS & GYNECOLOGY | Admitting: OBSTETRICS & GYNECOLOGY
Payer: COMMERCIAL

## 2020-04-24 VITALS
SYSTOLIC BLOOD PRESSURE: 130 MMHG | DIASTOLIC BLOOD PRESSURE: 80 MMHG | WEIGHT: 166.69 LBS | BODY MASS INDEX: 33.66 KG/M2

## 2020-04-24 VITALS
RESPIRATION RATE: 18 BRPM | WEIGHT: 166 LBS | DIASTOLIC BLOOD PRESSURE: 86 MMHG | BODY MASS INDEX: 33.47 KG/M2 | SYSTOLIC BLOOD PRESSURE: 143 MMHG | HEIGHT: 59 IN | HEART RATE: 81 BPM | OXYGEN SATURATION: 99 %

## 2020-04-24 DIAGNOSIS — O10.013 PRE-EXISTING ESSENTIAL HYPERTENSION DURING PREGNANCY IN THIRD TRIMESTER: ICD-10-CM

## 2020-04-24 DIAGNOSIS — O10.019 PRE-EXISTING ESSENTIAL HYPERTENSION DURING PREGNANCY, ANTEPARTUM: Primary | ICD-10-CM

## 2020-04-24 DIAGNOSIS — O09.93 SUPERVISION OF HIGH RISK PREGNANCY IN THIRD TRIMESTER: ICD-10-CM

## 2020-04-24 DIAGNOSIS — O09.522 AMA (ADVANCED MATERNAL AGE) MULTIGRAVIDA 35+, SECOND TRIMESTER: ICD-10-CM

## 2020-04-24 DIAGNOSIS — O28.9 ABNORMAL ANTENATAL TEST: ICD-10-CM

## 2020-04-24 LAB
ALBUMIN SERPL BCP-MCNC: 3 G/DL (ref 3.5–5.2)
ALP SERPL-CCNC: 125 U/L (ref 55–135)
ALT SERPL W/O P-5'-P-CCNC: 10 U/L (ref 10–44)
ANION GAP SERPL CALC-SCNC: 11 MMOL/L (ref 8–16)
AST SERPL-CCNC: 21 U/L (ref 10–40)
BASOPHILS # BLD AUTO: 0.04 K/UL (ref 0–0.2)
BASOPHILS NFR BLD: 0.4 % (ref 0–1.9)
BILIRUB SERPL-MCNC: 0.2 MG/DL (ref 0.1–1)
BUN SERPL-MCNC: 13 MG/DL (ref 6–20)
CALCIUM SERPL-MCNC: 9 MG/DL (ref 8.7–10.5)
CHLORIDE SERPL-SCNC: 105 MMOL/L (ref 95–110)
CO2 SERPL-SCNC: 19 MMOL/L (ref 23–29)
CREAT SERPL-MCNC: 0.7 MG/DL (ref 0.5–1.4)
CREAT UR-MCNC: 125.3 MG/DL (ref 15–325)
DIFFERENTIAL METHOD: ABNORMAL
EOSINOPHIL # BLD AUTO: 0.1 K/UL (ref 0–0.5)
EOSINOPHIL NFR BLD: 1 % (ref 0–8)
ERYTHROCYTE [DISTWIDTH] IN BLOOD BY AUTOMATED COUNT: 13.2 % (ref 11.5–14.5)
EST. GFR  (AFRICAN AMERICAN): >60 ML/MIN/1.73 M^2
EST. GFR  (NON AFRICAN AMERICAN): >60 ML/MIN/1.73 M^2
GLUCOSE SERPL-MCNC: 111 MG/DL (ref 70–110)
HCT VFR BLD AUTO: 32.3 % (ref 37–48.5)
HGB BLD-MCNC: 10.6 G/DL (ref 12–16)
IMM GRANULOCYTES # BLD AUTO: 0.09 K/UL (ref 0–0.04)
IMM GRANULOCYTES NFR BLD AUTO: 0.9 % (ref 0–0.5)
LYMPHOCYTES # BLD AUTO: 1.7 K/UL (ref 1–4.8)
LYMPHOCYTES NFR BLD: 16.3 % (ref 18–48)
MCH RBC QN AUTO: 29.1 PG (ref 27–31)
MCHC RBC AUTO-ENTMCNC: 32.8 G/DL (ref 32–36)
MCV RBC AUTO: 89 FL (ref 82–98)
MONOCYTES # BLD AUTO: 1.1 K/UL (ref 0.3–1)
MONOCYTES NFR BLD: 10.1 % (ref 4–15)
NEUTROPHILS # BLD AUTO: 7.5 K/UL (ref 1.8–7.7)
NEUTROPHILS NFR BLD: 71.3 % (ref 38–73)
NRBC BLD-RTO: 0 /100 WBC
PLATELET # BLD AUTO: 266 K/UL (ref 150–350)
PMV BLD AUTO: 10.2 FL (ref 9.2–12.9)
POTASSIUM SERPL-SCNC: 3.8 MMOL/L (ref 3.5–5.1)
PROT SERPL-MCNC: 7.3 G/DL (ref 6–8.4)
PROT UR-MCNC: 14 MG/DL (ref 0–15)
PROT/CREAT UR: 0.11 MG/G{CREAT} (ref 0–0.2)
RBC # BLD AUTO: 3.64 M/UL (ref 4–5.4)
SODIUM SERPL-SCNC: 135 MMOL/L (ref 136–145)
WBC # BLD AUTO: 10.52 K/UL (ref 3.9–12.7)

## 2020-04-24 PROCEDURE — 0502F SUBSEQUENT PRENATAL CARE: CPT | Mod: S$GLB,,, | Performed by: ADVANCED PRACTICE MIDWIFE

## 2020-04-24 PROCEDURE — 99999 PR PBB SHADOW E&M-EST. PATIENT-LVL II: CPT | Mod: PBBFAC,,, | Performed by: ADVANCED PRACTICE MIDWIFE

## 2020-04-24 PROCEDURE — 99211 OFF/OP EST MAY X REQ PHY/QHP: CPT | Mod: 25

## 2020-04-24 PROCEDURE — 87081 CULTURE SCREEN ONLY: CPT

## 2020-04-24 PROCEDURE — 84156 ASSAY OF PROTEIN URINE: CPT

## 2020-04-24 PROCEDURE — 76819 FETAL BIOPHYS PROFIL W/O NST: CPT | Mod: 59,S$GLB,, | Performed by: OBSTETRICS & GYNECOLOGY

## 2020-04-24 PROCEDURE — G0378 HOSPITAL OBSERVATION PER HR: HCPCS

## 2020-04-24 PROCEDURE — G0379 DIRECT REFER HOSPITAL OBSERV: HCPCS

## 2020-04-24 PROCEDURE — 0502F PR SUBSEQUENT PRENATAL CARE: ICD-10-PCS | Mod: S$GLB,,, | Performed by: ADVANCED PRACTICE MIDWIFE

## 2020-04-24 PROCEDURE — 99999 PR PBB SHADOW E&M-EST. PATIENT-LVL II: ICD-10-PCS | Mod: PBBFAC,,, | Performed by: ADVANCED PRACTICE MIDWIFE

## 2020-04-24 PROCEDURE — 76816 OB US FOLLOW-UP PER FETUS: CPT | Mod: 59,S$GLB,, | Performed by: OBSTETRICS & GYNECOLOGY

## 2020-04-24 PROCEDURE — 59025 FETAL NON-STRESS TEST: CPT

## 2020-04-24 PROCEDURE — 76819 PR US, OB, FETAL BIOPHYSICAL, W/O NST: ICD-10-PCS | Mod: 59,S$GLB,, | Performed by: OBSTETRICS & GYNECOLOGY

## 2020-04-24 PROCEDURE — 85025 COMPLETE CBC W/AUTO DIFF WBC: CPT

## 2020-04-24 PROCEDURE — 80053 COMPREHEN METABOLIC PANEL: CPT

## 2020-04-24 PROCEDURE — 99213 PR OFFICE/OUTPT VISIT, EST, LEVL III, 20-29 MIN: ICD-10-PCS | Mod: 25,,, | Performed by: OBSTETRICS & GYNECOLOGY

## 2020-04-24 PROCEDURE — 76816 PR  US,PREGNANT UTERUS,F/U,TRANSABD APP: ICD-10-PCS | Mod: 59,S$GLB,, | Performed by: OBSTETRICS & GYNECOLOGY

## 2020-04-24 PROCEDURE — 59025 PR FETAL 2N-STRESS TEST: ICD-10-PCS | Mod: 26,59,, | Performed by: OBSTETRICS & GYNECOLOGY

## 2020-04-24 PROCEDURE — 99213 OFFICE O/P EST LOW 20 MIN: CPT | Mod: 25,,, | Performed by: OBSTETRICS & GYNECOLOGY

## 2020-04-24 PROCEDURE — 59025 FETAL NON-STRESS TEST: CPT | Mod: 26,59,, | Performed by: OBSTETRICS & GYNECOLOGY

## 2020-04-24 RX ORDER — ONDANSETRON 8 MG/1
8 TABLET, ORALLY DISINTEGRATING ORAL EVERY 8 HOURS PRN
Status: DISCONTINUED | OUTPATIENT
Start: 2020-04-24 | End: 2020-04-24 | Stop reason: HOSPADM

## 2020-04-24 RX ORDER — ACETAMINOPHEN 500 MG
500 TABLET ORAL EVERY 6 HOURS PRN
Status: DISCONTINUED | OUTPATIENT
Start: 2020-04-24 | End: 2020-04-24 | Stop reason: HOSPADM

## 2020-04-24 RX ORDER — SODIUM CHLORIDE 9 MG/ML
INJECTION, SOLUTION INTRAVENOUS CONTINUOUS
Status: DISCONTINUED | OUTPATIENT
Start: 2020-04-24 | End: 2020-04-24 | Stop reason: HOSPADM

## 2020-04-24 RX ORDER — ASPIRIN 81 MG/1
81 TABLET ORAL DAILY
Status: ON HOLD | COMMUNITY
End: 2020-05-10 | Stop reason: HOSPADM

## 2020-04-24 NOTE — DISCHARGE SUMMARY
[facility] -   Obstetrics  Discharge Summary      Patient Name: Sabrina Ureña  MRN: [id number]  Admission Date: 2020  Hospital Length of Stay: 0 days  Discharge Date and Time:  2020 4:06 PM  Attending Physician: Beronica Warner, *   Discharging Provider: Pauline Rizvi CNM   Primary Care Provider: Alexys Telles MD    HPI: 40 y.o  36.1 wks, CHTN, controlled,  sent in for further evaluation as office bpp .    FHT: Cat 1 (reassuring)  TOCO:  Occasional/ mild     * No surgery found *     Hospital Course:   Placed on CFM for NST. Patient states baby has remained very active. Has no h/a or visual changes. Will do baseline pre-e labs.          Final Active Diagnoses:    Diagnosis Date Noted POA    Abnormal  test [O28.9] 2020 Yes      Problems Resolved During this Admission:        Labs:   CMP   Recent Labs   Lab 20  1445   *   K 3.8      CO2 19*   *   BUN 13   CREATININE 0.7   CALCIUM 9.0   PROT 7.3   ALBUMIN 3.0*   BILITOT 0.2   ALKPHOS 125   AST 21   ALT 10   ANIONGAP 11   ESTGFRAFRICA >60   EGFRNONAA >60    and CBC   Recent Labs   Lab 20  1445   WBC 10.52   HGB 10.6*   HCT 32.3*          Feeding Method: undelivered    Immunizations     None          This patient has no babies on file.  Pending Diagnostic Studies:     None          Discharged Condition: good    Disposition: Home or Self Care    Follow Up:    Patient Instructions:   No discharge procedures on file.  Medications:  Current Discharge Medication List      CONTINUE these medications which have NOT CHANGED    Details   aspirin (ECOTRIN) 81 MG EC tablet Take 81 mg by mouth once daily.      labetalol (NORMODYNE) 200 MG tablet Take 1 tablet (200 mg total) by mouth 2 (two) times daily.  Qty: 60 tablet, Refills: 11      levothyroxine (SYNTHROID) 125 MCG tablet Take 1 tablet (125 mcg total) by mouth once daily.  Qty: 30 tablet, Refills: 11      prenatal vit  87-iron-folic-dha (PRENATE MINI, FERR ASP GLYCIN,) 18-1-350 mg Cap Take 1 tablet by mouth once daily.  Qty: 30 capsule, Refills: 3      acyclovir (ZOVIRAX) 400 MG tablet Take 1 tablet (400 mg total) by mouth 2 (two) times daily. for 5 days  Qty: 10 tablet, Refills: 11             Pauline Rizvi CNM  Obstetrics [facility]

## 2020-04-24 NOTE — NURSING
Pt to L&D from office due to BPP 6/8 in office today.  Pt with hx of chtn on labetalol.  Assessment completed with no c/o of pre-e symptoms.  Mild BLE swelling noted with hyperreflexia noted.  Fetal monitoring and serial BP's initiated.

## 2020-04-24 NOTE — PROCEDURES
NST    2020  36w1d  Indication for Test: abnormal  testing  FHR: 140 baseline  Variability: present, moderate  Decelerations: Absent  Accelerations: Present, multiple. Accels meeting 15 x 15 rule  Interpretation: Reactive and reassuring.

## 2020-04-24 NOTE — DISCHARGE INSTRUCTIONS

## 2020-04-24 NOTE — HOSPITAL COURSE
Placed on CFM for NST. Patient states baby has remained very active. Has no h/a or visual changes. Will do baseline pre-e labs.

## 2020-04-24 NOTE — H&P
[facility] -   Obstetrics  History & Physical    Patient Name: Sabrina Ureña  MRN: [id number]  Admission Date: 2020  Primary Care Provider: Alexys Telles MD    Subjective:     Principal Problem:<principal problem not specified>    History of Present Illness:  40 y.o  36.1 wks, CHTN, controlled,  sent in for further evaluation as office bpp .    Obstetric HPI: abnormal  testing  Patient reports None contractions, active fetal movement, No vaginal bleeding , No loss of fluid     This pregnancy has been complicated by CHTN, AMA, iatrogenic hypothyroidism    OB History    Para Term  AB Living   2 1 0 0 0 1   SAB TAB Ectopic Multiple Live Births   0 0 0 0 1      # Outcome Date GA Lbr Diego/2nd Weight Sex Delivery Anes PTL Lv   2 Current            1 Para 01   2.268 kg (5 lb) M Vag-Spont None  RAQUEL     Past Medical History:   Diagnosis Date    Hypertension     Hyperthyroidism     s/p radioact thy ablation    Iatrogenic hypothyroidism      Past Surgical History:   Procedure Laterality Date    breast augmentation      radioactive ablation thyroid             PTA Medications   Medication Sig    aspirin (ECOTRIN) 81 MG EC tablet Take 81 mg by mouth once daily.    labetalol (NORMODYNE) 200 MG tablet Take 1 tablet (200 mg total) by mouth 2 (two) times daily.    levothyroxine (SYNTHROID) 125 MCG tablet Take 1 tablet (125 mcg total) by mouth once daily.    prenatal vit 87-iron-folic-dha (PRENATE MINI, FERR ASP GLYCIN,) 18-1-350 mg Cap Take 1 tablet by mouth once daily.    acyclovir (ZOVIRAX) 400 MG tablet Take 1 tablet (400 mg total) by mouth 2 (two) times daily. for 5 days       Review of patient's allergies indicates:   Allergen Reactions    Amlodipine      Heart racing        Family History     Problem Relation (Age of Onset)    Gout Father    Hyperlipidemia Mother    Hypertension Mother        Tobacco Use    Smoking status: Never Smoker    Smokeless  tobacco: Never Used   Substance and Sexual Activity    Alcohol use: No    Drug use: No    Sexual activity: Yes     Partners: Male     Birth control/protection: OCP     Review of Systems   Constitutional: Negative for fever.   Eyes: Negative for visual disturbance.   Respiratory: Negative for cough and shortness of breath.    Cardiovascular: Negative for chest pain.   Gastrointestinal: Negative for nausea and vomiting.   Genitourinary: Negative for vaginal bleeding.   Neurological: Negative for headaches.      Objective:     Vital Signs (Most Recent):  Pulse: 91 (04/24/20 1430)  Resp: 18 (04/24/20 1430)  BP: (!) 151/89 (04/24/20 1430)  SpO2: 99 % (04/24/20 1430) Vital Signs (24h Range):  Pulse:  [91-93] 91  Resp:  [18-20] 18  SpO2:  [99 %] 99 %  BP: (130-155)/(63-89) 151/89     Weight: 75.3 kg (166 lb)  Body mass index is 33.53 kg/m².    FHT: Cat 1 (reassuring)  TOCO: Occasional, mild ctx.     Physical Exam:   Constitutional: She is oriented to person, place, and time. She appears well-developed and well-nourished.    HENT:   Head: Normocephalic.    Eyes: EOM are normal.    Neck: Neck supple.    Cardiovascular: Normal rate, regular rhythm and normal heart sounds.     Pulmonary/Chest: Effort normal and breath sounds normal.        Abdominal: Soft.     Genitourinary: Uterus normal. No vaginal discharge found.   Genitourinary Comments: Gravid, soft and non-tender.           Musculoskeletal: Normal range of motion and moves all extremeties. She exhibits no edema.       Neurological: She is alert and oriented to person, place, and time. She has normal reflexes.    Skin: Skin is warm and dry.    Psychiatric: She has a normal mood and affect.       Cervix:   deferred     Significant Labs:  Lab Results   Component Value Date    GROUPTRH O POS 10/28/2019    HEPBSAG Negative 10/28/2019       CBC:   Recent Labs   Lab 04/24/20  1445   WBC 10.52   RBC 3.64*   HGB 10.6*   HCT 32.3*      MCV 89   MCH 29.1   MCHC 32.8      CMP:   Recent Labs   Lab 20  1445   *   CALCIUM 9.0   ALBUMIN 3.0*   PROT 7.3   *   K 3.8   CO2 19*      BUN 13   CREATININE 0.7   ALKPHOS 125   ALT 10   AST 21   BILITOT 0.2     I have personallly reviewed all pertinent lab results from the last 24 hours.    Assessment/Plan:     40 y.o. female  at 36w1d for:    Abnormal  test  NST reactive and reassuring bringing cumulative test surveillance to 8/10 for bpp evaluation. Pre-e labs are normal.   Patient will be released home to [facility], watch for precautions of pre-e and keep next appt as scheduled.         Pauline Rizvi CNM  Obstetrics  [facility]

## 2020-04-24 NOTE — ASSESSMENT & PLAN NOTE
NST reactive and reassuring bringing cumulative test surveillance to 8/10 for bpp evaluation. Pre-e labs are normal.   Patient will be released home to [facility], watch for precautions of pre-e and keep next appt as scheduled.

## 2020-04-24 NOTE — SUBJECTIVE & OBJECTIVE
Obstetric HPI: abnormal  testing  Patient reports None contractions, active fetal movement, No vaginal bleeding , No loss of fluid     This pregnancy has been complicated by CHTN, AMA, iatrogenic hypothyroidism    OB History    Para Term  AB Living   2 1 0 0 0 1   SAB TAB Ectopic Multiple Live Births   0 0 0 0 1      # Outcome Date GA Lbr Diego/2nd Weight Sex Delivery Anes PTL Lv   2 Current            1 Para 01   2.268 kg (5 lb) M Vag-Spont None  RAQUEL     Past Medical History:   Diagnosis Date    Hypertension     Hyperthyroidism     s/p radioact thy ablation    Iatrogenic hypothyroidism      Past Surgical History:   Procedure Laterality Date    breast augmentation      radioactive ablation thyroid             PTA Medications   Medication Sig    aspirin (ECOTRIN) 81 MG EC tablet Take 81 mg by mouth once daily.    labetalol (NORMODYNE) 200 MG tablet Take 1 tablet (200 mg total) by mouth 2 (two) times daily.    levothyroxine (SYNTHROID) 125 MCG tablet Take 1 tablet (125 mcg total) by mouth once daily.    prenatal vit 87-iron-folic-dha (PRENATE MINI, FERR ASP GLYCIN,) 18-1-350 mg Cap Take 1 tablet by mouth once daily.    acyclovir (ZOVIRAX) 400 MG tablet Take 1 tablet (400 mg total) by mouth 2 (two) times daily. for 5 days       Review of patient's allergies indicates:   Allergen Reactions    Amlodipine      Heart racing        Family History     Problem Relation (Age of Onset)    Gout Father    Hyperlipidemia Mother    Hypertension Mother        Tobacco Use    Smoking status: Never Smoker    Smokeless tobacco: Never Used   Substance and Sexual Activity    Alcohol use: No    Drug use: No    Sexual activity: Yes     Partners: Male     Birth control/protection: OCP     Review of Systems   Constitutional: Negative for fever.   Eyes: Negative for visual disturbance.   Respiratory: Negative for cough and shortness of breath.    Cardiovascular: Negative for chest pain.    Gastrointestinal: Negative for nausea and vomiting.   Genitourinary: Negative for vaginal bleeding.   Neurological: Negative for headaches.      Objective:     Vital Signs (Most Recent):  Pulse: 91 (04/24/20 1430)  Resp: 18 (04/24/20 1430)  BP: (!) 151/89 (04/24/20 1430)  SpO2: 99 % (04/24/20 1430) Vital Signs (24h Range):  Pulse:  [91-93] 91  Resp:  [18-20] 18  SpO2:  [99 %] 99 %  BP: (130-155)/(63-89) 151/89     Weight: 75.3 kg (166 lb)  Body mass index is 33.53 kg/m².    FHT: Cat 1 (reassuring)  TOCO: Occasional, mild ctx.     Physical Exam:   Constitutional: She is oriented to person, place, and time. She appears well-developed and well-nourished.    HENT:   Head: Normocephalic.    Eyes: EOM are normal.    Neck: Neck supple.    Cardiovascular: Normal rate, regular rhythm and normal heart sounds.     Pulmonary/Chest: Effort normal and breath sounds normal.        Abdominal: Soft.     Genitourinary: Uterus normal. No vaginal discharge found.   Genitourinary Comments: Gravid, soft and non-tender.           Musculoskeletal: Normal range of motion and moves all extremeties. She exhibits no edema.       Neurological: She is alert and oriented to person, place, and time. She has normal reflexes.    Skin: Skin is warm and dry.    Psychiatric: She has a normal mood and affect.       Cervix:   deferred     Significant Labs:  Lab Results   Component Value Date    GROUPTRH O POS 10/28/2019    HEPBSAG Negative 10/28/2019       CBC:   Recent Labs   Lab 04/24/20  1445   WBC 10.52   RBC 3.64*   HGB 10.6*   HCT 32.3*      MCV 89   MCH 29.1   MCHC 32.8     CMP:   Recent Labs   Lab 04/24/20  1445   *   CALCIUM 9.0   ALBUMIN 3.0*   PROT 7.3   *   K 3.8   CO2 19*      BUN 13   CREATININE 0.7   ALKPHOS 125   ALT 10   AST 21   BILITOT 0.2     I have personallly reviewed all pertinent lab results from the last 24 hours.

## 2020-04-30 ENCOUNTER — PATIENT OUTREACH (OUTPATIENT)
Dept: ADMINISTRATIVE | Facility: OTHER | Age: 40
End: 2020-04-30

## 2020-05-01 ENCOUNTER — ROUTINE PRENATAL (OUTPATIENT)
Dept: OBSTETRICS AND GYNECOLOGY | Facility: CLINIC | Age: 40
End: 2020-05-01
Payer: COMMERCIAL

## 2020-05-01 ENCOUNTER — PROCEDURE VISIT (OUTPATIENT)
Dept: OBSTETRICS AND GYNECOLOGY | Facility: CLINIC | Age: 40
End: 2020-05-01
Payer: COMMERCIAL

## 2020-05-01 VITALS — DIASTOLIC BLOOD PRESSURE: 84 MMHG | BODY MASS INDEX: 33.8 KG/M2 | SYSTOLIC BLOOD PRESSURE: 142 MMHG | WEIGHT: 167.31 LBS

## 2020-05-01 DIAGNOSIS — O10.013 PRE-EXISTING ESSENTIAL HYPERTENSION DURING PREGNANCY IN THIRD TRIMESTER: ICD-10-CM

## 2020-05-01 DIAGNOSIS — O09.523 AMA (ADVANCED MATERNAL AGE) MULTIGRAVIDA 35+, THIRD TRIMESTER: Primary | ICD-10-CM

## 2020-05-01 DIAGNOSIS — O10.019 PRE-EXISTING ESSENTIAL HYPERTENSION DURING PREGNANCY, ANTEPARTUM: ICD-10-CM

## 2020-05-01 DIAGNOSIS — O09.522 AMA (ADVANCED MATERNAL AGE) MULTIGRAVIDA 35+, SECOND TRIMESTER: ICD-10-CM

## 2020-05-01 LAB — BACTERIA SPEC AEROBE CULT: NORMAL

## 2020-05-01 PROCEDURE — 76819 PR US, OB, FETAL BIOPHYSICAL, W/O NST: ICD-10-PCS | Mod: S$GLB,,, | Performed by: OBSTETRICS & GYNECOLOGY

## 2020-05-01 PROCEDURE — 0502F SUBSEQUENT PRENATAL CARE: CPT | Mod: S$GLB,,, | Performed by: ADVANCED PRACTICE MIDWIFE

## 2020-05-01 PROCEDURE — 99999 PR PBB SHADOW E&M-EST. PATIENT-LVL II: CPT | Mod: PBBFAC,,, | Performed by: ADVANCED PRACTICE MIDWIFE

## 2020-05-01 PROCEDURE — 99999 PR PBB SHADOW E&M-EST. PATIENT-LVL II: ICD-10-PCS | Mod: PBBFAC,,, | Performed by: ADVANCED PRACTICE MIDWIFE

## 2020-05-01 PROCEDURE — 76819 FETAL BIOPHYS PROFIL W/O NST: CPT | Mod: S$GLB,,, | Performed by: OBSTETRICS & GYNECOLOGY

## 2020-05-01 PROCEDURE — 0502F PR SUBSEQUENT PRENATAL CARE: ICD-10-PCS | Mod: S$GLB,,, | Performed by: ADVANCED PRACTICE MIDWIFE

## 2020-05-01 NOTE — PROGRESS NOTES
BPP 8/8 MVP 4.6  Denies headache, blurred vision, epigastic pain  Will check BP at home 2xd and send on Monday  Will schedule induction next week if BP stable

## 2020-05-04 ENCOUNTER — PATIENT MESSAGE (OUTPATIENT)
Dept: OBSTETRICS AND GYNECOLOGY | Facility: CLINIC | Age: 40
End: 2020-05-04

## 2020-05-07 ENCOUNTER — PATIENT OUTREACH (OUTPATIENT)
Dept: ADMINISTRATIVE | Facility: OTHER | Age: 40
End: 2020-05-07

## 2020-05-08 ENCOUNTER — PROCEDURE VISIT (OUTPATIENT)
Dept: OBSTETRICS AND GYNECOLOGY | Facility: CLINIC | Age: 40
End: 2020-05-08
Payer: COMMERCIAL

## 2020-05-08 ENCOUNTER — ROUTINE PRENATAL (OUTPATIENT)
Dept: OBSTETRICS AND GYNECOLOGY | Facility: CLINIC | Age: 40
End: 2020-05-08
Payer: COMMERCIAL

## 2020-05-08 VITALS — WEIGHT: 166 LBS | DIASTOLIC BLOOD PRESSURE: 92 MMHG | SYSTOLIC BLOOD PRESSURE: 152 MMHG | BODY MASS INDEX: 33.53 KG/M2

## 2020-05-08 DIAGNOSIS — O10.013 PRE-EXISTING ESSENTIAL HYPERTENSION DURING PREGNANCY IN THIRD TRIMESTER: ICD-10-CM

## 2020-05-08 DIAGNOSIS — O10.013 PRE-EXISTING ESSENTIAL HYPERTENSION DURING PREGNANCY IN THIRD TRIMESTER: Primary | ICD-10-CM

## 2020-05-08 DIAGNOSIS — O09.522 AMA (ADVANCED MATERNAL AGE) MULTIGRAVIDA 35+, SECOND TRIMESTER: ICD-10-CM

## 2020-05-08 DIAGNOSIS — O16.3 HYPERTENSION AFFECTING PREGNANCY IN THIRD TRIMESTER: ICD-10-CM

## 2020-05-08 PROCEDURE — 99999 PR PBB SHADOW E&M-EST. PATIENT-LVL II: ICD-10-PCS | Mod: PBBFAC,,, | Performed by: ADVANCED PRACTICE MIDWIFE

## 2020-05-08 PROCEDURE — 76819 FETAL BIOPHYS PROFIL W/O NST: CPT | Mod: S$GLB,,, | Performed by: OBSTETRICS & GYNECOLOGY

## 2020-05-08 PROCEDURE — 99999 PR PBB SHADOW E&M-EST. PATIENT-LVL II: CPT | Mod: PBBFAC,,, | Performed by: ADVANCED PRACTICE MIDWIFE

## 2020-05-08 PROCEDURE — 0502F SUBSEQUENT PRENATAL CARE: CPT | Mod: S$GLB,,, | Performed by: ADVANCED PRACTICE MIDWIFE

## 2020-05-08 PROCEDURE — 0502F PR SUBSEQUENT PRENATAL CARE: ICD-10-PCS | Mod: S$GLB,,, | Performed by: ADVANCED PRACTICE MIDWIFE

## 2020-05-08 PROCEDURE — 76819 PR US, OB, FETAL BIOPHYSICAL, W/O NST: ICD-10-PCS | Mod: S$GLB,,, | Performed by: OBSTETRICS & GYNECOLOGY

## 2020-05-08 RX ORDER — OXYTOCIN/RINGER'S LACTATE 30/500 ML
95 PLASTIC BAG, INJECTION (ML) INTRAVENOUS ONCE
Status: CANCELLED | OUTPATIENT
Start: 2020-05-08 | End: 2020-05-08

## 2020-05-08 RX ORDER — ONDANSETRON 4 MG/1
8 TABLET, ORALLY DISINTEGRATING ORAL EVERY 8 HOURS PRN
Status: CANCELLED | OUTPATIENT
Start: 2020-05-08

## 2020-05-08 RX ORDER — SODIUM CHLORIDE 9 MG/ML
INJECTION, SOLUTION INTRAVENOUS
Status: CANCELLED | OUTPATIENT
Start: 2020-05-08

## 2020-05-08 RX ORDER — OXYTOCIN/RINGER'S LACTATE 30/500 ML
334 PLASTIC BAG, INJECTION (ML) INTRAVENOUS ONCE
Status: CANCELLED | OUTPATIENT
Start: 2020-05-08 | End: 2020-05-08

## 2020-05-08 RX ORDER — CALCIUM CARBONATE 200(500)MG
500 TABLET,CHEWABLE ORAL 3 TIMES DAILY PRN
Status: CANCELLED | OUTPATIENT
Start: 2020-05-08

## 2020-05-08 RX ORDER — SIMETHICONE 80 MG
1 TABLET,CHEWABLE ORAL 4 TIMES DAILY PRN
Status: CANCELLED | OUTPATIENT
Start: 2020-05-08

## 2020-05-08 RX ORDER — SODIUM CHLORIDE, SODIUM LACTATE, POTASSIUM CHLORIDE, CALCIUM CHLORIDE 600; 310; 30; 20 MG/100ML; MG/100ML; MG/100ML; MG/100ML
INJECTION, SOLUTION INTRAVENOUS CONTINUOUS
Status: CANCELLED | OUTPATIENT
Start: 2020-05-08

## 2020-05-08 RX ORDER — OXYTOCIN/RINGER'S LACTATE 30/500 ML
2 PLASTIC BAG, INJECTION (ML) INTRAVENOUS CONTINUOUS
Status: CANCELLED | OUTPATIENT
Start: 2020-05-08

## 2020-05-08 NOTE — PROGRESS NOTES
BP elevated today denies headache, blurred vision or epigastric pain  Induction scheduled for tonight  BPP 8/8 MVP 6.3

## 2020-05-09 ENCOUNTER — HOSPITAL ENCOUNTER (INPATIENT)
Facility: HOSPITAL | Age: 40
LOS: 1 days | Discharge: HOME | End: 2020-05-10
Attending: OBSTETRICS & GYNECOLOGY | Admitting: OBSTETRICS & GYNECOLOGY
Payer: COMMERCIAL

## 2020-05-09 ENCOUNTER — ANESTHESIA (OUTPATIENT)
Dept: OBSTETRICS AND GYNECOLOGY | Facility: HOSPITAL | Age: 40
End: 2020-05-09
Payer: COMMERCIAL

## 2020-05-09 ENCOUNTER — ANESTHESIA EVENT (OUTPATIENT)
Dept: OBSTETRICS AND GYNECOLOGY | Facility: HOSPITAL | Age: 40
End: 2020-05-09
Payer: COMMERCIAL

## 2020-05-09 DIAGNOSIS — O16.3 HYPERTENSION AFFECTING PREGNANCY IN THIRD TRIMESTER: ICD-10-CM

## 2020-05-09 DIAGNOSIS — O10.013 PRE-EXISTING ESSENTIAL HYPERTENSION DURING PREGNANCY IN THIRD TRIMESTER: ICD-10-CM

## 2020-05-09 LAB
ABO + RH BLD: NORMAL
ALBUMIN SERPL BCP-MCNC: 3.1 G/DL (ref 3.5–5.2)
ALP SERPL-CCNC: 138 U/L (ref 55–135)
ALT SERPL W/O P-5'-P-CCNC: 10 U/L (ref 10–44)
ANION GAP SERPL CALC-SCNC: 15 MMOL/L (ref 8–16)
AST SERPL-CCNC: 29 U/L (ref 10–40)
BASOPHILS # BLD AUTO: 0.03 K/UL (ref 0–0.2)
BASOPHILS NFR BLD: 0.3 % (ref 0–1.9)
BILIRUB SERPL-MCNC: 0.2 MG/DL (ref 0.1–1)
BLD GP AB SCN CELLS X3 SERPL QL: NORMAL
BUN SERPL-MCNC: 15 MG/DL (ref 6–20)
CALCIUM SERPL-MCNC: 9.3 MG/DL (ref 8.7–10.5)
CHLORIDE SERPL-SCNC: 104 MMOL/L (ref 95–110)
CO2 SERPL-SCNC: 17 MMOL/L (ref 23–29)
CREAT SERPL-MCNC: 0.7 MG/DL (ref 0.5–1.4)
CREAT UR-MCNC: 145.5 MG/DL (ref 15–325)
DIFFERENTIAL METHOD: ABNORMAL
EOSINOPHIL # BLD AUTO: 0.1 K/UL (ref 0–0.5)
EOSINOPHIL NFR BLD: 1 % (ref 0–8)
ERYTHROCYTE [DISTWIDTH] IN BLOOD BY AUTOMATED COUNT: 13.5 % (ref 11.5–14.5)
EST. GFR  (AFRICAN AMERICAN): >60 ML/MIN/1.73 M^2
EST. GFR  (NON AFRICAN AMERICAN): >60 ML/MIN/1.73 M^2
GLUCOSE SERPL-MCNC: 83 MG/DL (ref 70–110)
HCO3 UR-SCNC: 17.8 MMOL/L (ref 24–28)
HCT VFR BLD AUTO: 33.6 % (ref 37–48.5)
HGB BLD-MCNC: 10.5 G/DL (ref 12–16)
IMM GRANULOCYTES # BLD AUTO: 0.08 K/UL (ref 0–0.04)
IMM GRANULOCYTES NFR BLD AUTO: 0.7 % (ref 0–0.5)
LYMPHOCYTES # BLD AUTO: 1.6 K/UL (ref 1–4.8)
LYMPHOCYTES NFR BLD: 13.6 % (ref 18–48)
MCH RBC QN AUTO: 27.3 PG (ref 27–31)
MCHC RBC AUTO-ENTMCNC: 31.3 G/DL (ref 32–36)
MCV RBC AUTO: 88 FL (ref 82–98)
MONOCYTES # BLD AUTO: 1.1 K/UL (ref 0.3–1)
MONOCYTES NFR BLD: 9.5 % (ref 4–15)
NEUTROPHILS # BLD AUTO: 8.8 K/UL (ref 1.8–7.7)
NEUTROPHILS NFR BLD: 74.9 % (ref 38–73)
NRBC BLD-RTO: 0 /100 WBC
PCO2 BLDA: 51.9 MMHG (ref 35–45)
PH SMN: 7.14 [PH] (ref 7.35–7.45)
PLATELET # BLD AUTO: 257 K/UL (ref 150–350)
PMV BLD AUTO: 10.8 FL (ref 9.2–12.9)
PO2 BLDA: 21 MMHG (ref 80–100)
POC BE: -11 MMOL/L
POC SATURATED O2: 22 % (ref 95–100)
POTASSIUM SERPL-SCNC: 4.1 MMOL/L (ref 3.5–5.1)
PROT SERPL-MCNC: 7.7 G/DL (ref 6–8.4)
PROT UR-MCNC: 16 MG/DL (ref 0–15)
PROT/CREAT UR: 0.11 MG/G{CREAT} (ref 0–0.2)
RBC # BLD AUTO: 3.84 M/UL (ref 4–5.4)
SAMPLE: ABNORMAL
SARS-COV-2 RDRP RESP QL NAA+PROBE: NEGATIVE
SODIUM SERPL-SCNC: 136 MMOL/L (ref 136–145)
WBC # BLD AUTO: 11.78 K/UL (ref 3.9–12.7)

## 2020-05-09 PROCEDURE — 82570 ASSAY OF URINE CREATININE: CPT

## 2020-05-09 PROCEDURE — 51701 INSERT BLADDER CATHETER: CPT

## 2020-05-09 PROCEDURE — 80053 COMPREHEN METABOLIC PANEL: CPT

## 2020-05-09 PROCEDURE — 72100003 HC LABOR CARE, EA. ADDL. 8 HRS

## 2020-05-09 PROCEDURE — 36416 COLLJ CAPILLARY BLOOD SPEC: CPT

## 2020-05-09 PROCEDURE — 25000003 PHARM REV CODE 250: Performed by: ADVANCED PRACTICE MIDWIFE

## 2020-05-09 PROCEDURE — 88307 TISSUE EXAM BY PATHOLOGIST: CPT | Performed by: PATHOLOGY

## 2020-05-09 PROCEDURE — 85025 COMPLETE CBC W/AUTO DIFF WBC: CPT

## 2020-05-09 PROCEDURE — 86850 RBC ANTIBODY SCREEN: CPT

## 2020-05-09 PROCEDURE — 62326 NJX INTERLAMINAR LMBR/SAC: CPT | Performed by: NURSE ANESTHETIST, CERTIFIED REGISTERED

## 2020-05-09 PROCEDURE — 27800516 HC TRAY, EPIDURAL COMBO: Performed by: ANESTHESIOLOGY

## 2020-05-09 PROCEDURE — 82803 BLOOD GASES ANY COMBINATION: CPT

## 2020-05-09 PROCEDURE — 72200005 HC VAGINAL DELIVERY LEVEL II

## 2020-05-09 PROCEDURE — 88307 TISSUE EXAM BY PATHOLOGIST: CPT | Mod: 26,,, | Performed by: PATHOLOGY

## 2020-05-09 PROCEDURE — 25000003 PHARM REV CODE 250: Performed by: NURSE ANESTHETIST, CERTIFIED REGISTERED

## 2020-05-09 PROCEDURE — 63600175 PHARM REV CODE 636 W HCPCS: Performed by: ADVANCED PRACTICE MIDWIFE

## 2020-05-09 PROCEDURE — U0002 COVID-19 LAB TEST NON-CDC: HCPCS

## 2020-05-09 PROCEDURE — 99900035 HC TECH TIME PER 15 MIN (STAT)

## 2020-05-09 PROCEDURE — 88307 PR  SURG PATH,LEVEL V: ICD-10-PCS | Mod: 26,,, | Performed by: PATHOLOGY

## 2020-05-09 PROCEDURE — 63600175 PHARM REV CODE 636 W HCPCS: Performed by: NURSE ANESTHETIST, CERTIFIED REGISTERED

## 2020-05-09 PROCEDURE — 72100002 HC LABOR CARE, 1ST 8 HOURS

## 2020-05-09 PROCEDURE — 11000001 HC ACUTE MED/SURG PRIVATE ROOM

## 2020-05-09 PROCEDURE — 27200710 HC EPIDURAL INFUSION PUMP SET: Performed by: ANESTHESIOLOGY

## 2020-05-09 RX ORDER — OXYTOCIN/RINGER'S LACTATE 30/500 ML
2 PLASTIC BAG, INJECTION (ML) INTRAVENOUS CONTINUOUS
Status: DISCONTINUED | OUTPATIENT
Start: 2020-05-09 | End: 2020-05-09

## 2020-05-09 RX ORDER — LEVOTHYROXINE SODIUM 125 UG/1
125 TABLET ORAL
Status: DISCONTINUED | OUTPATIENT
Start: 2020-05-09 | End: 2020-05-09

## 2020-05-09 RX ORDER — HYDROCODONE BITARTRATE AND ACETAMINOPHEN 7.5; 325 MG/1; MG/1
1 TABLET ORAL EVERY 4 HOURS PRN
Status: DISCONTINUED | OUTPATIENT
Start: 2020-05-09 | End: 2020-05-10 | Stop reason: HOSPADM

## 2020-05-09 RX ORDER — SODIUM CHLORIDE, SODIUM LACTATE, POTASSIUM CHLORIDE, CALCIUM CHLORIDE 600; 310; 30; 20 MG/100ML; MG/100ML; MG/100ML; MG/100ML
INJECTION, SOLUTION INTRAVENOUS CONTINUOUS
Status: DISCONTINUED | OUTPATIENT
Start: 2020-05-09 | End: 2020-05-09

## 2020-05-09 RX ORDER — SODIUM CHLORIDE 9 MG/ML
INJECTION, SOLUTION INTRAVENOUS
Status: DISCONTINUED | OUTPATIENT
Start: 2020-05-09 | End: 2020-05-09

## 2020-05-09 RX ORDER — PRENATAL WITH FERROUS FUM AND FOLIC ACID 3080; 920; 120; 400; 22; 1.84; 3; 20; 10; 1; 12; 200; 27; 25; 2 [IU]/1; [IU]/1; MG/1; [IU]/1; MG/1; MG/1; MG/1; MG/1; MG/1; MG/1; UG/1; MG/1; MG/1; MG/1; MG/1
1 TABLET ORAL DAILY
Status: DISCONTINUED | OUTPATIENT
Start: 2020-05-10 | End: 2020-05-10 | Stop reason: HOSPADM

## 2020-05-09 RX ORDER — ACETAMINOPHEN 325 MG/1
650 TABLET ORAL EVERY 6 HOURS PRN
Status: DISCONTINUED | OUTPATIENT
Start: 2020-05-09 | End: 2020-05-10 | Stop reason: HOSPADM

## 2020-05-09 RX ORDER — ONDANSETRON 8 MG/1
8 TABLET, ORALLY DISINTEGRATING ORAL EVERY 8 HOURS PRN
Status: DISCONTINUED | OUTPATIENT
Start: 2020-05-09 | End: 2020-05-10 | Stop reason: HOSPADM

## 2020-05-09 RX ORDER — HYDROCODONE BITARTRATE AND ACETAMINOPHEN 5; 325 MG/1; MG/1
1 TABLET ORAL EVERY 4 HOURS PRN
Status: DISCONTINUED | OUTPATIENT
Start: 2020-05-09 | End: 2020-05-10 | Stop reason: HOSPADM

## 2020-05-09 RX ORDER — CALCIUM CARBONATE 200(500)MG
500 TABLET,CHEWABLE ORAL 3 TIMES DAILY PRN
Status: DISCONTINUED | OUTPATIENT
Start: 2020-05-09 | End: 2020-05-09

## 2020-05-09 RX ORDER — HYDROCORTISONE 25 MG/G
CREAM TOPICAL 3 TIMES DAILY PRN
Status: DISCONTINUED | OUTPATIENT
Start: 2020-05-09 | End: 2020-05-10 | Stop reason: HOSPADM

## 2020-05-09 RX ORDER — DIPHENHYDRAMINE HCL 25 MG
25 CAPSULE ORAL EVERY 4 HOURS PRN
Status: DISCONTINUED | OUTPATIENT
Start: 2020-05-09 | End: 2020-05-10 | Stop reason: HOSPADM

## 2020-05-09 RX ORDER — ROPIVACAINE HYDROCHLORIDE 2 MG/ML
INJECTION, SOLUTION EPIDURAL; INFILTRATION; PERINEURAL CONTINUOUS PRN
Status: DISCONTINUED | OUTPATIENT
Start: 2020-05-09 | End: 2020-05-10

## 2020-05-09 RX ORDER — OXYTOCIN/RINGER'S LACTATE 30/500 ML
95 PLASTIC BAG, INJECTION (ML) INTRAVENOUS ONCE
Status: DISCONTINUED | OUTPATIENT
Start: 2020-05-09 | End: 2020-05-09

## 2020-05-09 RX ORDER — AMMONIA 15 % (W/V)
0.3 AMPUL (EA) INHALATION CONTINUOUS PRN
Status: DISCONTINUED | OUTPATIENT
Start: 2020-05-09 | End: 2020-05-10 | Stop reason: HOSPADM

## 2020-05-09 RX ORDER — OXYTOCIN/RINGER'S LACTATE 30/500 ML
334 PLASTIC BAG, INJECTION (ML) INTRAVENOUS ONCE
Status: DISCONTINUED | OUTPATIENT
Start: 2020-05-09 | End: 2020-05-09

## 2020-05-09 RX ORDER — LABETALOL 200 MG/1
200 TABLET, FILM COATED ORAL EVERY 12 HOURS
Status: DISCONTINUED | OUTPATIENT
Start: 2020-05-09 | End: 2020-05-09 | Stop reason: SDUPTHER

## 2020-05-09 RX ORDER — ROPIVACAINE HYDROCHLORIDE 2 MG/ML
INJECTION, SOLUTION EPIDURAL; INFILTRATION; PERINEURAL
Status: DISCONTINUED | OUTPATIENT
Start: 2020-05-09 | End: 2020-05-10

## 2020-05-09 RX ORDER — LEVOTHYROXINE SODIUM 125 UG/1
125 TABLET ORAL
Status: DISCONTINUED | OUTPATIENT
Start: 2020-05-10 | End: 2020-05-10 | Stop reason: HOSPADM

## 2020-05-09 RX ORDER — LABETALOL 200 MG/1
200 TABLET, FILM COATED ORAL EVERY 12 HOURS
Status: DISCONTINUED | OUTPATIENT
Start: 2020-05-09 | End: 2020-05-10 | Stop reason: HOSPADM

## 2020-05-09 RX ORDER — MISOPROSTOL 200 UG/1
400 TABLET ORAL ONCE
Status: COMPLETED | OUTPATIENT
Start: 2020-05-09 | End: 2020-05-09

## 2020-05-09 RX ORDER — DOCUSATE SODIUM 100 MG/1
100 CAPSULE, LIQUID FILLED ORAL DAILY
Status: DISCONTINUED | OUTPATIENT
Start: 2020-05-10 | End: 2020-05-10 | Stop reason: HOSPADM

## 2020-05-09 RX ORDER — ONDANSETRON 8 MG/1
8 TABLET, ORALLY DISINTEGRATING ORAL EVERY 8 HOURS PRN
Status: DISCONTINUED | OUTPATIENT
Start: 2020-05-09 | End: 2020-05-09

## 2020-05-09 RX ORDER — IBUPROFEN 600 MG/1
600 TABLET ORAL EVERY 6 HOURS
Status: DISCONTINUED | OUTPATIENT
Start: 2020-05-10 | End: 2020-05-10 | Stop reason: HOSPADM

## 2020-05-09 RX ORDER — LIDOCAINE HYDROCHLORIDE AND EPINEPHRINE 15; 5 MG/ML; UG/ML
INJECTION, SOLUTION EPIDURAL
Status: DISCONTINUED | OUTPATIENT
Start: 2020-05-09 | End: 2020-05-10

## 2020-05-09 RX ORDER — SIMETHICONE 80 MG
1 TABLET,CHEWABLE ORAL 4 TIMES DAILY PRN
Status: DISCONTINUED | OUTPATIENT
Start: 2020-05-09 | End: 2020-05-09

## 2020-05-09 RX ADMIN — ROPIVACAINE HYDROCHLORIDE 4 ML: 2 INJECTION, SOLUTION EPIDURAL; INFILTRATION at 11:05

## 2020-05-09 RX ADMIN — SODIUM CHLORIDE, SODIUM LACTATE, POTASSIUM CHLORIDE, AND CALCIUM CHLORIDE: .6; .31; .03; .02 INJECTION, SOLUTION INTRAVENOUS at 02:05

## 2020-05-09 RX ADMIN — Medication 2 MILLI-UNITS/MIN: at 02:05

## 2020-05-09 RX ADMIN — SODIUM CHLORIDE, SODIUM LACTATE, POTASSIUM CHLORIDE, AND CALCIUM CHLORIDE: .6; .31; .03; .02 INJECTION, SOLUTION INTRAVENOUS at 11:05

## 2020-05-09 RX ADMIN — ACETAMINOPHEN 650 MG: 325 TABLET ORAL at 07:05

## 2020-05-09 RX ADMIN — SODIUM CHLORIDE, SODIUM LACTATE, POTASSIUM CHLORIDE, AND CALCIUM CHLORIDE 1000 ML: .6; .31; .03; .02 INJECTION, SOLUTION INTRAVENOUS at 10:05

## 2020-05-09 RX ADMIN — ROPIVACAINE HYDROCHLORIDE 12 ML/HR: 2 INJECTION, SOLUTION EPIDURAL; INFILTRATION at 11:05

## 2020-05-09 RX ADMIN — SODIUM CHLORIDE, SODIUM LACTATE, POTASSIUM CHLORIDE, AND CALCIUM CHLORIDE: .6; .31; .03; .02 INJECTION, SOLUTION INTRAVENOUS at 07:05

## 2020-05-09 RX ADMIN — LABETALOL HYDROCHLORIDE 200 MG: 200 TABLET, FILM COATED ORAL at 07:05

## 2020-05-09 RX ADMIN — LEVOTHYROXINE SODIUM 125 MCG: 125 TABLET ORAL at 09:05

## 2020-05-09 RX ADMIN — LIDOCAINE HYDROCHLORIDE,EPINEPHRINE BITARTRATE 3 ML: 15; .005 INJECTION, SOLUTION EPIDURAL; INFILTRATION; INTRACAUDAL; PERINEURAL at 11:05

## 2020-05-09 RX ADMIN — MISOPROSTOL 400 MCG: 200 TABLET ORAL at 06:05

## 2020-05-09 RX ADMIN — IBUPROFEN 600 MG: 600 TABLET, FILM COATED ORAL at 07:05

## 2020-05-09 RX ADMIN — LABETALOL HYDROCHLORIDE 200 MG: 200 TABLET, FILM COATED ORAL at 09:05

## 2020-05-09 NOTE — SUBJECTIVE & OBJECTIVE
Obstetric HPI:  Patient reports to L&D for IOL d/t pre existing htn, active fetal movement, No vaginal bleeding , No loss of fluid     This pregnancy has been complicated by pre existing htn, AMA    OB History    Para Term  AB Living   2 1 0 0 0 1   SAB TAB Ectopic Multiple Live Births   0 0 0 0 1      # Outcome Date GA Lbr Diego/2nd Weight Sex Delivery Anes PTL Lv   2 Current            1 Para 01   2.268 kg (5 lb) M Vag-Spont None  RAQUEL     Past Medical History:   Diagnosis Date    Hypertension     Hyperthyroidism     s/p radioact thy ablation    Iatrogenic hypothyroidism      Past Surgical History:   Procedure Laterality Date    breast augmentation      radioactive ablation thyroid             PTA Medications   Medication Sig    acyclovir (ZOVIRAX) 400 MG tablet Take 1 tablet (400 mg total) by mouth 2 (two) times daily. for 5 days    aspirin (ECOTRIN) 81 MG EC tablet Take 81 mg by mouth once daily.    labetalol (NORMODYNE) 200 MG tablet Take 1 tablet (200 mg total) by mouth 2 (two) times daily.    levothyroxine (SYNTHROID) 125 MCG tablet Take 1 tablet (125 mcg total) by mouth once daily.    prenatal vit 87-iron-folic-dha (PRENATE MINI, FERR ASP GLYCIN,) 18-1-350 mg Cap Take 1 tablet by mouth once daily.       Review of patient's allergies indicates:   Allergen Reactions    Amlodipine      Heart racing        Family History     Problem Relation (Age of Onset)    Gout Father    Hyperlipidemia Mother    Hypertension Mother        Tobacco Use    Smoking status: Never Smoker    Smokeless tobacco: Never Used   Substance and Sexual Activity    Alcohol use: No    Drug use: No    Sexual activity: Yes     Partners: Male     Birth control/protection: None     Review of Systems   Gastrointestinal: Positive for abdominal pain.   Genitourinary: Positive for vaginal discharge.   Musculoskeletal: Positive for back pain.      Objective:     Vital Signs (Most Recent):    Vital Signs (24h  Range):  BP: (152)/(92) 152/92        There is no height or weight on file to calculate BMI.    FHT: Cat 1 (reassuring)  TOCO:  Q 5-6 minutes    Physical Exam:   Constitutional: She is oriented to person, place, and time. She appears well-developed and well-nourished.      Neck: Normal range of motion.     Pulmonary/Chest: Effort normal.        Abdominal: Soft.             Musculoskeletal: Normal range of motion and moves all extremeties. She exhibits no edema.       Neurological: She is alert and oriented to person, place, and time.    Skin: Skin is warm and dry.    Psychiatric: She has a normal mood and affect. Her behavior is normal. Judgment and thought content normal.       Cervix:  Dilation:  3 per CNM in office  Effacement:  70%  Station: -2  Presentation: Vertex     Significant Labs:  Lab Results   Component Value Date    GROUPTRH O POS 10/28/2019    HEPBSAG Negative 10/28/2019    STREPBCULT No Group B Streptococcus isolated 04/24/2020       I have personallly reviewed all pertinent lab results from the last 24 hours.  Recent Lab Results     None

## 2020-05-09 NOTE — H&P
[facility] -   Obstetrics  History & Physical    Patient Name: Sabrina Ureña  MRN: [id number]  Admission Date: 2020  Primary Care Provider: Alexys Telles MD    Subjective:     Principal Problem:Hypertension affecting pregnancy in third trimester    History of Present Illness:  , 38w 3d, presents to unit for scheduled IOL d/t pre existing hypertension      Obstetric HPI:  Patient reports to L&D for IOL d/t pre existing htn, active fetal movement, No vaginal bleeding , No loss of fluid     This pregnancy has been complicated by pre existing htn, AMA    OB History    Para Term  AB Living   2 1 0 0 0 1   SAB TAB Ectopic Multiple Live Births   0 0 0 0 1      # Outcome Date GA Lbr Diego/2nd Weight Sex Delivery Anes PTL Lv   2 Current            1 Para 01   2.268 kg (5 lb) M Vag-Spont None  RAQUEL     Past Medical History:   Diagnosis Date    Hypertension     Hyperthyroidism     s/p radioact thy ablation    Iatrogenic hypothyroidism      Past Surgical History:   Procedure Laterality Date    breast augmentation      radioactive ablation thyroid             PTA Medications   Medication Sig    acyclovir (ZOVIRAX) 400 MG tablet Take 1 tablet (400 mg total) by mouth 2 (two) times daily. for 5 days    aspirin (ECOTRIN) 81 MG EC tablet Take 81 mg by mouth once daily.    labetalol (NORMODYNE) 200 MG tablet Take 1 tablet (200 mg total) by mouth 2 (two) times daily.    levothyroxine (SYNTHROID) 125 MCG tablet Take 1 tablet (125 mcg total) by mouth once daily.    prenatal vit 87-iron-folic-dha (PRENATE MINI, FERR ASP GLYCIN,) 18-1-350 mg Cap Take 1 tablet by mouth once daily.       Review of patient's allergies indicates:   Allergen Reactions    Amlodipine      Heart racing        Family History     Problem Relation (Age of Onset)    Gout Father    Hyperlipidemia Mother    Hypertension Mother        Tobacco Use    Smoking status: Never Smoker    Smokeless tobacco: Never  Used   Substance and Sexual Activity    Alcohol use: No    Drug use: No    Sexual activity: Yes     Partners: Male     Birth control/protection: None     Review of Systems   Gastrointestinal: Positive for abdominal pain.   Genitourinary: Positive for vaginal discharge.   Musculoskeletal: Positive for back pain.      Objective:     Vital Signs (Most Recent):    Vital Signs (24h Range):  BP: (152)/(92) 152/92        There is no height or weight on file to calculate BMI.    FHT: Cat 1 (reassuring)  TOCO:  Q 5-6 minutes    Physical Exam:   Constitutional: She is oriented to person, place, and time. She appears well-developed and well-nourished.      Neck: Normal range of motion.     Pulmonary/Chest: Effort normal.        Abdominal: Soft.             Musculoskeletal: Normal range of motion and moves all extremeties. She exhibits no edema.       Neurological: She is alert and oriented to person, place, and time.    Skin: Skin is warm and dry.    Psychiatric: She has a normal mood and affect. Her behavior is normal. Judgment and thought content normal.       Cervix:  Dilation:  3 per CNM in office  Effacement:  70%  Station: -2  Presentation: Vertex     Significant Labs:  Lab Results   Component Value Date    GROUPTRH O POS 10/28/2019    HEPBSAG Negative 10/28/2019    STREPBCULT No Group B Streptococcus isolated 2020       I have personallly reviewed all pertinent lab results from the last 24 hours.  Recent Lab Results     None        Assessment/Plan:     40 y.o. female  at 38w2d for:    * Hypertension affecting pregnancy in third trimester  IOL recommended d/t pre existing HTN        Arturo Barba CNM  Obstetrics  [facility] -

## 2020-05-09 NOTE — PROGRESS NOTES
S: tolerating UC  O:  VS reviewed, afebrile   Vitals:    20 0831 20 0901 20 1003 20 1031   BP: 133/75 (!) 141/79 (!) 149/80 (!) 147/84   Pulse: 70 69 75 72   Resp:       Temp:       TempSrc:       SpO2:           FHTs Cat 2,late decels after pitocin increased, d/c pitocin, pt repositioned, IV bolus provided  UC Q 2-4 min   SVE 5/90/v/-2 BOW bulging    A: IUP @ 38w2d ;     Patient Active Problem List   Diagnosis    Hypertension    Iatrogenic hypothyroidism    Elderly multigravida, currently pregnant    Abnormal glucose in pregnancy, antepartum    Abnormal  test    Hypertension affecting pregnancy in third trimester       P: recommended pt get epidural at this time, reassess FHR pattern, AROM if decels resolve  Continue close monitoring of maternal/fetal status

## 2020-05-09 NOTE — PROGRESS NOTES
S: comfortable with epidural  O:  VS reviewed, afebrile   Vitals:    20 1131 20 1134 20 1137 20 1146   BP: 136/83 135/77 125/75 131/77   Pulse: 86 85 84 79   Resp:       Temp:       TempSrc:       SpO2:   99%        FHTs Cat 1 moderate variability + accels at this time  UC Q 3-4.5 min no pitocin  SVE 6/90/v/-2 AROM clear fluid    A: IUP @ 38w2d ;     Patient Active Problem List   Diagnosis    Hypertension    Iatrogenic hypothyroidism    Elderly multigravida, currently pregnant    Abnormal glucose in pregnancy, antepartum    Abnormal  test    Hypertension affecting pregnancy in third trimester       P: assess UC after AROM, slowly resume pitocin if UC not adequate  Continue close monitoring of maternal/fetal status

## 2020-05-09 NOTE — PROGRESS NOTES
[facility]  Obstetrics  Labor Progress Note    Patient Name: Sabrina Ureña  MRN: [id number]  Admission Date: 2020  Hospital Length of Stay: 0 days  Attending Physician: Beronica Warner, *  Primary Care Provider: Alexys Telles MD    Subjective:     Principal Problem:Hypertension affecting pregnancy in third trimester    Hospital Course:  Admit for IOL  Epidural when pt desires  Anticipate progress      Interval History:  Sabrina is a 40 y.o.  at 38w2d. She is doing well, uncomfortable but not ready for epidural    Objective:     Vital Signs (Most Recent):  Temp: 98.8 °F (37.1 °C) (20 0100)  Pulse: 78 (20 07)  BP: (!) 128/50 (20 07)  SpO2: 98 % (20 0530) Vital Signs (24h Range):  Temp:  [98.8 °F (37.1 °C)] 98.8 °F (37.1 °C)  Pulse:  [74-92] 78  SpO2:  [98 %-100 %] 98 %  BP: (128-171)/() 128/50        There is no height or weight on file to calculate BMI.    FHT: Cat 2 (reassuring) moderate variability + accels and late decels, pt repositioned, resolved  TOCO:  Q 2.5-3 minutes    Cervical Exam:  Dilation:  4 per RN       Significant Labs:  Lab Results   Component Value Date    GROUPTRH O POS 2020    HEPBSAG Negative 10/28/2019    STREPBCULT No Group B Streptococcus isolated 2020       Recent Lab Results       20  0445   20  0120   20  0033        Albumin   3.1       Alkaline Phosphatase   138       ALT   10       Anion Gap   15       AST   29       Baso #   0.03       Basophil%   0.3       BILIRUBIN TOTAL   0.2  Comment:  For infants and newborns, interpretation of results should be based  on gestational age, weight and in agreement with clinical  observations.  Premature Infant recommended reference ranges:  Up to 24 hours.............<8.0 mg/dL  Up to 48 hours............<12.0 mg/dL  3-5 days..................<15.0 mg/dL  6-29 days.................<15.0 mg/dL         BUN, Bld   15       Calcium   9.3       Chloride    104       CO2   17       Creatinine   0.7       Creatinine, Random Ur 145.5  Comment:  The random urine reference ranges provided were established   for 24 hour urine collections.  No reference ranges exist for  random urine specimens.  Correlate clinically.           Differential Method   Automated       eGFR if    >60       eGFR if non    >60  Comment:  Calculation used to obtain the estimated glomerular filtration  rate (eGFR) is the CKD-EPI equation.          Eos #   0.1       Eosinophil%   1.0       Glucose   83       Gran # (ANC)   8.8       Gran%   74.9       Group & Rh   O POS       Hematocrit   33.6       Hemoglobin   10.5       Immature Grans (Abs)   0.08  Comment:  Mild elevation in immature granulocytes is non specific and   can be seen in a variety of conditions including stress response,   acute inflammation, trauma and pregnancy. Correlation with other   laboratory and clinical findings is essential.         Immature Granulocytes   0.7       INDIRECT J CARLOS   NEG       Lymph #   1.6       Lymph%   13.6       MCH   27.3       MCHC   31.3       MCV   88       Mono #   1.1       Mono%   9.5       MPV   10.8       nRBC   0       Platelets   257       Potassium   4.1       Prot/Creat Ratio, Ur 0.11         PROTEIN TOTAL   7.7       Protein, Urine Random 16  Comment:  The random urine reference ranges provided were established   for 24 hour urine collections.  No reference ranges exist for  random urine specimens.  Correlate clinically.           RBC   3.84       RDW   13.5       SARS-CoV-2 RNA, Amplification, Qual     Negative  Comment:  This test utilizes isothermal nucleic acid amplification   technology to detect the SARS-CoV-2 RdRp nucleic acid segment.   The analytical sensitivity (limit of detection) is 125 genome   equivalents/mL.   A POSITIVE result implies infection with the SARS-CoV-2 virus;  the patient is presumed to be contagious.    A NEGATIVE result means that  SARS-CoV-2 nucleic acids are not  present above the limit of detection. It does not rule out the   possibility of COVID-19 and should not be the sole basis for   treatment decisions. If COVID-19 is strongly suspected based on  clinical and exposure history, re-testing should be considered.   This test is only for use under the Food and Drug   Administration s Emergency Use Authorization (EUA).   Commercial kits are provided by [employer].   Performance characteristics of the EUA have been independently  verified by [facility] Department of  Pathology and Laboratory Medicine.   _________________________________________________________________  The ID NOW COVID-19 Letter of Authorization, along with the   authorized Fact Sheet for Healthcare Providers, the authorized Fact  Sheet for Patients, and authorized labeling are available on the FDA   website:  www.fda.gov/MedicalDevices/Safety/EmergencySituations/okf038305.htm       Sodium   136       WBC   11.78             Physical Exam    Assessment/Plan:     40 y.o. female  at 38w2d for:    * Hypertension affecting pregnancy in third trimester  IOL recommended d/t pre existing HTN  Labetalol 200 mg BID    Iatrogenic hypothyroidism  synthroid          Jenae Mao CNM  Obstetrics  [facility]

## 2020-05-09 NOTE — PROGRESS NOTES
S: comfortable with epidural, called by nurse reporting decels  O:  VS reviewed, afebrile   Vitals:    20 1601 20 1616 20 1631 20 1645   BP: (!) 156/97 (!) 155/88 (!) 152/89 (!) 164/87   Pulse: 96 88 92 95   Resp:       Temp:       TempSrc:       SpO2:    100%       FHTs  Cat 2, repetitive late decels, minimum to moderate variability, resolved with pitocin d/c and position changes  UC Q 3-4 min  SVE 8/90/v/-1     A: IUP @ 38w2d ;     Patient Active Problem List   Diagnosis    Hypertension    Iatrogenic hypothyroidism    Elderly multigravida, currently pregnant    Abnormal glucose in pregnancy, antepartum    Abnormal  test    Hypertension affecting pregnancy in third trimester       P:   Continue close monitoring of maternal/fetal status, d/c pitocin

## 2020-05-09 NOTE — L&D DELIVERY NOTE
[facility]  Vaginal Delivery   Operative Note    SUMMARY     Normal spontaneous vaginal delivery of live infant, ROT over intact perineum, poor tone, no respiratory effort, cord clamped and cut immediately and transferred to [place] warmer.  Nuchal cord: No.    Spontaneous delivery of placenta and IV pitocin given noting good uterine tone.  1st degree laceration noted.  Patient tolerated delivery well. Sponge needle and lap counted correctly x2.    Indications: Hypertension affecting pregnancy in third trimester  Pregnancy complicated by:   Patient Active Problem List   Diagnosis    Hypertension    Iatrogenic hypothyroidism    Elderly multigravida, currently pregnant    Abnormal glucose in pregnancy, antepartum    Abnormal  test    Hypertension affecting pregnancy in third trimester    Outcome of delivery, single liveborn    First degree perineal laceration during delivery     Admitting GA: 38w2d    Delivery Information for Jose A Ureña    Birth information:  YOB: 2022   Time of birth: 5:40 PM   Sex: male   Head Delivery Date/Time: 2020  5:40 PM   Delivery type: Vaginal, Spontaneous   Gestational Age: 38w2d    Delivery Providers    Delivering clinician:  Jenae Mao CNM   Provider Role    Cele Diaz RN Registered Nurse    Delaney Caruso, RN Registered Nurse    Mariama Oliveira, [facility]            Measurements    Weight:    Length:           Apgars    Living status:  Living  Apgars:   1 min.:   5 min.:   10 min.:   15 min.:   20 min.:     Skin color:   0        Heart rate:   2        Reflex irritability:   0        Muscle tone:   0        Respiratory effort:   0        Total:   2               Operative Delivery    Forceps attempted?:  No  Vacuum extractor attempted?:  No         Shoulder Dystocia    Shoulder dystocia present?:  No           Presentation    Presentation:  Vertex  Position:  Right Occiput Transverse            Interventions/Resuscitation    Method:  Tactile Stimulation, Deep Suctioning, CPAP, PPV       Cord    Vessels:  3 vessels  Complications:  None  Delayed Cord Clamping?:  No  Cord Blood Disposition:  Lab  Gases Sent?:  Yes       Placenta    Placenta delivery date/time:  2020 174  Placenta removal:  Spontaneous  Placenta appearance:  Intact  Placenta disposition:  discarded           Labor Events:       labor:       Labor Onset Date/Time:         Dilation Complete Date/Time:         Start Pushing Date/Time:         Start Pushing Date/Time:       Rupture Date/Time:              Rupture type:           Fluid Amount: Small      Fluid Color: Clear      Fluid Odor:        Membrane Status: ARM (Artificial Rupture)               steroids:       Antibiotics given for GBS:       Induction:       Indications for induction:        Augmentation:       Indications for augmentation:       Labor complications:       Additional complications:          Cervical ripening:                     Delivery:      Episiotomy:       Indication for Episiotomy:       Perineal Lacerations: 1st Repaired:  Yes   Periurethral Laceration:   Repaired:     Labial Laceration:   Repaired:     Sulcus Laceration:   Repaired:     Vaginal Laceration:   Repaired:     Cervical Laceration:   Repaired:     Repair suture:       Repair # of packets: 1     Last Value - EBL - Nursing (mL): 50     Sum - EBL - Nursing (mL): 50     Last Value - EBL - Anesthesia (mL):      Calculated QBL (mL):        Vaginal Sweep Performed: No     Surgicount Correct: Yes       Other providers:       Anesthesia    Method:  Epidural          Details (if applicable):  Trial of Labor      Categorization:      Priority:     Indications for :     Incision Type:       Additional  information:  Forceps:    Vacuum:    Breech:    Observed anomalies    Other (Comments):

## 2020-05-09 NOTE — SUBJECTIVE & OBJECTIVE
Interval History:  Sabrina is a 40 y.o.  at 38w2d. She is doing well, uncomfortable but not ready for epidural    Objective:     Vital Signs (Most Recent):  Temp: 98.8 °F (37.1 °C) (20 0100)  Pulse: 78 (20 0701)  BP: (!) 128/50 (20 0701)  SpO2: 98 % (20 0530) Vital Signs (24h Range):  Temp:  [98.8 °F (37.1 °C)] 98.8 °F (37.1 °C)  Pulse:  [74-92] 78  SpO2:  [98 %-100 %] 98 %  BP: (128-171)/() 128/50        There is no height or weight on file to calculate BMI.    FHT: Cat 2 (reassuring) moderate variability + accels and late decels, pt repositioned, resolved  TOCO:  Q 2.5-3 minutes    Cervical Exam:  Dilation:  4 per RN       Significant Labs:  Lab Results   Component Value Date    GROUPTRH O POS 2020    HEPBSAG Negative 10/28/2019    STREPBCULT No Group B Streptococcus isolated 2020       Recent Lab Results       20  0445   20  0120   20  0033        Albumin   3.1       Alkaline Phosphatase   138       ALT   10       Anion Gap   15       AST   29       Baso #   0.03       Basophil%   0.3       BILIRUBIN TOTAL   0.2  Comment:  For infants and newborns, interpretation of results should be based  on gestational age, weight and in agreement with clinical  observations.  Premature Infant recommended reference ranges:  Up to 24 hours.............<8.0 mg/dL  Up to 48 hours............<12.0 mg/dL  3-5 days..................<15.0 mg/dL  6-29 days.................<15.0 mg/dL         BUN, Bld   15       Calcium   9.3       Chloride   104       CO2   17       Creatinine   0.7       Creatinine, Random Ur 145.5  Comment:  The random urine reference ranges provided were established   for 24 hour urine collections.  No reference ranges exist for  random urine specimens.  Correlate clinically.           Differential Method   Automated       eGFR if    >60       eGFR if non    >60  Comment:  Calculation used to obtain the estimated  glomerular filtration  rate (eGFR) is the CKD-EPI equation.          Eos #   0.1       Eosinophil%   1.0       Glucose   83       Gran # (ANC)   8.8       Gran%   74.9       Group & Rh   O POS       Hematocrit   33.6       Hemoglobin   10.5       Immature Grans (Abs)   0.08  Comment:  Mild elevation in immature granulocytes is non specific and   can be seen in a variety of conditions including stress response,   acute inflammation, trauma and pregnancy. Correlation with other   laboratory and clinical findings is essential.         Immature Granulocytes   0.7       INDIRECT J CARLOS   NEG       Lymph #   1.6       Lymph%   13.6       MCH   27.3       MCHC   31.3       MCV   88       Mono #   1.1       Mono%   9.5       MPV   10.8       nRBC   0       Platelets   257       Potassium   4.1       Prot/Creat Ratio, Ur 0.11         PROTEIN TOTAL   7.7       Protein, Urine Random 16  Comment:  The random urine reference ranges provided were established   for 24 hour urine collections.  No reference ranges exist for  random urine specimens.  Correlate clinically.           RBC   3.84       RDW   13.5       SARS-CoV-2 RNA, Amplification, Qual     Negative  Comment:  This test utilizes isothermal nucleic acid amplification   technology to detect the SARS-CoV-2 RdRp nucleic acid segment.   The analytical sensitivity (limit of detection) is 125 genome   equivalents/mL.   A POSITIVE result implies infection with the SARS-CoV-2 virus;  the patient is presumed to be contagious.    A NEGATIVE result means that SARS-CoV-2 nucleic acids are not  present above the limit of detection. It does not rule out the   possibility of COVID-19 and should not be the sole basis for   treatment decisions. If COVID-19 is strongly suspected based on  clinical and exposure history, re-testing should be considered.   This test is only for use under the Food and Drug   Administration s Emergency Use Authorization (EUA).   Commercial kits are provided by  [employer].   Performance characteristics of the EUA have been independently  verified by [facility] Department of  Pathology and Laboratory Medicine.   _________________________________________________________________  The ID NOW COVID-19 Letter of Authorization, along with the   authorized Fact Sheet for Healthcare Providers, the authorized Fact  Sheet for Patients, and authorized labeling are available on the FDA   website:  www.fda.gov/MedicalDevices/Safety/EmergencySituations/dbh455040.htm       Sodium   136       WBC   11.78             Physical Exam

## 2020-05-09 NOTE — PROGRESS NOTES
S: pushing   O:  VS reviewed, afebrile   Vitals:    20 1601 20 1616 20 1631 20 1645   BP: (!) 156/97 (!) 155/88 (!) 152/89 (!) 164/87   Pulse: 96 88 92 95   Resp:       Temp:       TempSrc:       SpO2:    100%       FHTs broken tracing, nurse at bedside pushing with pt, deep variables with prolonged decels in the 60s, UC Q 2-3 min, C/C/V/+2-+3 station       A: IUP @ 38w2d ;     Patient Active Problem List   Diagnosis    Hypertension    Iatrogenic hypothyroidism    Elderly multigravida, currently pregnant    Abnormal glucose in pregnancy, antepartum    Abnormal  test    Hypertension affecting pregnancy in third trimester       P:    imminent delivery

## 2020-05-09 NOTE — HOSPITAL COURSE
Admit for IOL  Epidural when pt desires  Anticipate progress  05/10/20 PPD1: baby transferred to NICU at , patient states she has no complaints and would like to be discharged, OK with d/c at 24 hours post delivery if stable, denies s/s of pre-e, VSSAF

## 2020-05-09 NOTE — ANESTHESIA PREPROCEDURE EVALUATION
05/09/2020  Sabrina Ureña is a 40 y.o., female.    Anesthesia Evaluation    I have reviewed the Patient Summary Reports.    I have reviewed the Nursing Notes.   I have reviewed the Medications.     Review of Systems  Anesthesia Hx:  No previous Anesthesia    Social:  Non-Smoker    Hematology/Oncology:  Hematology Normal   Oncology Normal     EENT/Dental:EENT/Dental Normal   Cardiovascular:   Exercise tolerance: good Hypertension NYHA Classification I    Pulmonary:  Pulmonary Normal    Renal/:  Renal/ Normal     Hepatic/GI:  Hepatic/GI Normal    Musculoskeletal:  Musculoskeletal Normal    Neurological:  Neurology Normal    Endocrine:   Hypothyroidism Hyperthyroidism    Dermatological:  Skin Normal    Psych:  Psychiatric Normal           Physical Exam  General:  Well nourished    Airway/Jaw/Neck:  Airway Findings: Mouth Opening: Normal Tongue: Normal  General Airway Assessment: Adult  Mallampati: II  Improves to I with phonation.  TM Distance: Normal, at least 6 cm        Eyes/Ears/Nose:  EYES/EARS/NOSE FINDINGS: Normal   Dental:  DENTAL FINDINGS: Normal   Chest/Lungs:  Chest/Lungs Findings: Clear to auscultation, Normal Respiratory Rate     Heart/Vascular:  Heart Findings: Rate: Normal  Rhythm: Regular Rhythm     Abdomen:  Abdomen Findings: Normal    Musculoskeletal:  Musculoskeletal Findings: Normal   Skin:  Skin Findings: Normal    Mental Status:  Mental Status Findings:  Cooperative, Alert and Oriented         Anesthesia Plan  Type of Anesthesia, risks & benefits discussed:  Anesthesia Type:  epidural  Patient's Preference:   Intra-op Monitoring Plan:   Intra-op Monitoring Plan Comments:   Post Op Pain Control Plan: multimodal analgesia  Post Op Pain Control Plan Comments:   Induction:    Beta Blocker:  Patient is not currently on a Beta-Blocker (No further documentation required).       Informed  Consent: Patient understands risks and agrees with Anesthesia plan.  Questions answered. Anesthesia consent signed with patient.  ASA Score: 2     Day of Surgery Review of History & Physical:    H&P update referred to the surgeon.         Ready For Surgery From Anesthesia Perspective.

## 2020-05-09 NOTE — NURSING
Discussed feeding choice with mother.  Reviewed benefits of breastfeeding and risks of formula feeding. Mother states her intention is to breastfeed.

## 2020-05-09 NOTE — ANESTHESIA PROCEDURE NOTES
Epidural    Patient location during procedure: OB   Reason for block: primary anesthetic   Diagnosis: iup,labor    Start time: 5/9/2020 11:12 AM  Timeout: 5/9/2020 11:11 AM  End time: 5/9/2020 11:28 AM    Staffing  Performing Provider: Erik Rust Jr., CRNA  Authorizing Provider: Erik Sommers II, MD        Preanesthetic Checklist  Completed: patient identified, site marked, surgical consent, pre-op evaluation, timeout performed, IV checked, risks and benefits discussed, monitors and equipment checked, anesthesia consent given, hand hygiene performed and patient being monitored  Preparation  Patient position: sitting  Prep: Betadine  Patient monitoring: Pulse Ox and Blood Pressure  Epidural  Skin Anesthetic: lidocaine 1%  Skin Wheal: 3 mL  Administration type: continuous  Approach: midline  Interspace: L3-4    Injection technique: NICK air  Needle and Epidural Catheter  Needle type: Tuohy   Needle gauge: 17  Needle length: 3.5 inches  Needle insertion depth: 4.5 cm  Catheter type: springwound and multi-orifice  Catheter size: 18 G  Catheter at skin depth: 10 cm  Test dose: 3 mL of lidocaine 1.5% with Epi 1-to-200,000  Additional Documentation: incremental injection, negative aspiration for heme and CSF, no paresthesia on injection, no signs/symptoms of IV or SA injection, no significant pain on injection and no significant complaints from patient  Needle localization: anatomical landmarks  Medications:  Volume per aspiration: 0 mL  Time between aspirations: 2 minutes  Assessment  Upper dermatomal levels - Left: T10  Right: T9   Dermatomal levels determined by alcohol wipe  Ease of block: easy  Patient's tolerance of the procedure: comfortable throughout block and no complaintsNo inadvertent dural puncture with Tuohy.  Dural puncture not performed with spinal needle

## 2020-05-10 VITALS
SYSTOLIC BLOOD PRESSURE: 134 MMHG | OXYGEN SATURATION: 100 % | TEMPERATURE: 98 F | HEART RATE: 74 BPM | DIASTOLIC BLOOD PRESSURE: 64 MMHG | RESPIRATION RATE: 18 BRPM

## 2020-05-10 PROCEDURE — 25000003 PHARM REV CODE 250: Performed by: ADVANCED PRACTICE MIDWIFE

## 2020-05-10 RX ADMIN — IBUPROFEN 600 MG: 600 TABLET, FILM COATED ORAL at 06:05

## 2020-05-10 RX ADMIN — IBUPROFEN 600 MG: 600 TABLET, FILM COATED ORAL at 11:05

## 2020-05-10 RX ADMIN — DOCUSATE SODIUM 100 MG: 100 CAPSULE, LIQUID FILLED ORAL at 08:05

## 2020-05-10 RX ADMIN — IBUPROFEN 600 MG: 600 TABLET, FILM COATED ORAL at 12:05

## 2020-05-10 RX ADMIN — LEVOTHYROXINE SODIUM 125 MCG: 125 TABLET ORAL at 06:05

## 2020-05-10 RX ADMIN — PRENATAL VITAMINS-IRON FUMARATE 27 MG IRON-FOLIC ACID 0.8 MG TABLET 1 TABLET: at 08:05

## 2020-05-10 RX ADMIN — LABETALOL HYDROCHLORIDE 200 MG: 200 TABLET, FILM COATED ORAL at 08:05

## 2020-05-10 NOTE — DISCHARGE SUMMARY
[facility] -   Obstetrics  Discharge Summary      Patient Name: Sabrina Ureña  MRN: [id number]  Admission Date: 2020  Hospital Length of Stay: 1 days  Discharge Date and Time:  05/10/2020 2:11 PM  Attending Physician: Beronica Warner, *   Discharging Provider: Sruthi Caicedo CNM   Primary Care Provider: Alexys Tellse MD    HPI: , 38w 3d, presents to unit for scheduled IOL d/t pre existing hypertension          * No surgery found *     Hospital Course:   Admit for IOL  Epidural when pt desires  Anticipate progress  05/10/20 PPD1: baby transferred to NICU at , patient states she has no complaints and would like to be discharged, OK with d/c at 24 hours post delivery if stable, denies s/s of pre-e, VSSAF           Final Active Diagnoses:    Diagnosis Date Noted POA    PRINCIPAL PROBLEM:   (spontaneous vaginal delivery) [O80] 05/10/2020 Not Applicable    Hypertension affecting pregnancy in third trimester [O16.3] 2020 Yes    Outcome of delivery, single liveborn [Z37.0] 2020 Not Applicable    First degree perineal laceration during delivery [O70.0] 2020 No    Iatrogenic hypothyroidism [E03.2]  Yes      Problems Resolved During this Admission:        Labs:   CBC   Recent Labs   Lab 20  0120   WBC 11.78   HGB 10.5*   HCT 33.6*          Feeding Method: breast    Immunizations     None          Delivery:    Episiotomy: None   Lacerations: 1st   Repair suture:     Repair # of packets: 1   Blood loss (ml): 50     Birth information:  YOB: 2022   Time of birth: 5:40 PM   Sex: male   Delivery type: Vaginal, Spontaneous   Gestational Age: 38w2d    Delivery Clinician:      Other providers:       Additional  information:  Forceps:    Vacuum:    Breech:    Observed anomalies      Living?:           APGARS  One minute Five minutes Ten minutes   Skin color:         Heart rate:         Grimace:         Muscle tone:         Breathing:          Totals: 2  5  6      Placenta: Delivered:       appearance    Pending Diagnostic Studies:     Procedure Component Value Units Date/Time    Specimen to Pathology, Surgery Gynecology and Obstetrics [[id number]] Collected:  05/09/20 1742    Order Status:  Sent Lab Status:  In process Updated:  05/09/20 1847          Discharged Condition: good    Disposition: Home or Self Care    Follow Up:  Follow-up Information     Follow up On 5/15/2020.    Why:  Blood pressure check           Ijeoma Werner CNM In 4 weeks.    Specialty:  Obstetrics  Why:  PP visit  Contact information:  [address]  [place] [address] [phone]                 Patient Instructions:      Activity as tolerated     Medications:  Current Discharge Medication List      CONTINUE these medications which have NOT CHANGED    Details   labetalol (NORMODYNE) 200 MG tablet Take 1 tablet (200 mg total) by mouth 2 (two) times daily.  Qty: 60 tablet, Refills: 11      levothyroxine (SYNTHROID) 125 MCG tablet Take 1 tablet (125 mcg total) by mouth once daily.  Qty: 30 tablet, Refills: 11      prenatal vit 87-iron-folic-dha (PRENATE MINI, FERR ASP GLYCIN,) 18-1-350 mg Cap Take 1 tablet by mouth once daily.  Qty: 30 capsule, Refills: 3         STOP taking these medications       acyclovir (ZOVIRAX) 400 MG tablet Comments:   Reason for Stopping:         aspirin (ECOTRIN) 81 MG EC tablet Comments:   Reason for Stopping:               Sruthi Caicedo CNM  Obstetrics  [facility]

## 2020-05-10 NOTE — LACTATION NOTE
Visited mother at bedside. NICU packet given; reviewed the use of the pump. Reviewed hand expression and mother is able to return demonstration. Mother is having her breast pump delivered at home today; should the pump not arrive, she knows she can rent pump here or at .

## 2020-05-10 NOTE — DISCHARGE INSTRUCTIONS

## 2020-05-10 NOTE — PLAN OF CARE
Progressing well. Fundus is firm and bleeding is light. Ambulates without noted complications. Voids spontaneously. Pain is well controlled with po meds. Afebrile and VSS. Will continue to monitor.

## 2020-05-10 NOTE — PROGRESS NOTES
Discharge education and follow-up instructions given. Verbalized understanding. Transported to car per wheelchair. Family with patient. NAD, VSS.

## 2020-05-10 NOTE — SUBJECTIVE & OBJECTIVE
Hospital course: Admit for IOL  Epidural when pt desires  Anticipate progress  05/10/20 PPD1: baby transferred to NICU at , patient states she has no complaints and would like to be discharged, OK with d/c at 24 hours post delivery if stable, denies s/s of pre-e, VSSAF      Interval History: PPD1    She is doing well this morning. She is tolerating a regular diet without nausea or vomiting. She is voiding spontaneously. She is ambulating. She has passed flatus, and has a BM. Vaginal bleeding is mild. She denies fever or chills. Abdominal pain is mild and controlled with oral medications. She is breastfeeding. She desires circumcision for her male baby: yes.    Objective:     Vital Signs (Most Recent):  Temp: 98.3 °F (36.8 °C) (05/10/20 1132)  Pulse: 77 (05/10/20 1132)  Resp: 18 (05/10/20 1132)  BP: 125/62 (05/10/20 1132)  SpO2: 100 % (05/09/20 1645) Vital Signs (24h Range):  Temp:  [97.6 °F (36.4 °C)-101.2 °F (38.4 °C)] 98.3 °F (36.8 °C)  Pulse:  [] 77  Resp:  [16-20] 18  SpO2:  [100 %] 100 %  BP: (115-164)/(62-97) 125/62        There is no height or weight on file to calculate BMI.      Intake/Output Summary (Last 24 hours) at 5/10/2020 1404  Last data filed at 5/10/2020 0037  Gross per 24 hour   Intake --   Output 1175 ml   Net -1175 ml       Significant Labs:  Lab Results   Component Value Date    GROUPTRH O POS 05/09/2020    HEPBSAG Negative 10/28/2019    STREPBCULT No Group B Streptococcus isolated 04/24/2020     Recent Labs   Lab 05/09/20  0120   HGB 10.5*   HCT 33.6*       I have personallly reviewed all pertinent lab results from the last 24 hours.    Physical Exam:   Constitutional: She is oriented to person, place, and time. She appears well-developed and well-nourished.    HENT:   Head: Normocephalic and atraumatic.    Eyes: Conjunctivae and EOM are normal.    Neck: Normal range of motion.    Cardiovascular: Normal rate, regular rhythm and normal heart sounds.  Exam reveals edema.    Mild BLE  edema    Pulmonary/Chest: Effort normal and breath sounds normal. No respiratory distress.        Abdominal: Soft. Bowel sounds are normal. She exhibits no distension. There is no tenderness.     Genitourinary: Vagina normal and uterus normal.           Musculoskeletal: Normal range of motion and moves all extremeties.       Neurological: She is alert and oriented to person, place, and time. She has normal reflexes. She displays normal reflexes.   No clonus    Skin: Skin is warm and dry.    Psychiatric: She has a normal mood and affect. Her behavior is normal. Judgment and thought content normal.

## 2020-05-10 NOTE — PROGRESS NOTES
[facility] -   Obstetrics  Postpartum Progress Note    Patient Name: Sabrina Ureña  MRN: [id number]  Admission Date: 2020  Hospital Length of Stay: 1 days  Attending Physician: Beronica Warner, *  Primary Care Provider: Alexys Telles MD    Subjective:     Principal Problem: (spontaneous vaginal delivery)    Hospital course: Admit for IOL  Epidural when pt desires  Anticipate progress  05/10/20 PPD1: baby transferred to NICU at , patient states she has no complaints and would like to be discharged, OK with d/c at 24 hours post delivery if stable, denies s/s of pre-e, VSSAF      Interval History: PPD1    She is doing well this morning. She is tolerating a regular diet without nausea or vomiting. She is voiding spontaneously. She is ambulating. She has passed flatus, and has a BM. Vaginal bleeding is mild. She denies fever or chills. Abdominal pain is mild and controlled with oral medications. She is breastfeeding. She desires circumcision for her male baby: yes.    Objective:     Vital Signs (Most Recent):  Temp: 98.3 °F (36.8 °C) (05/10/20 1132)  Pulse: 77 (05/10/20 1132)  Resp: 18 (05/10/20 1132)  BP: 125/62 (05/10/20 1132)  SpO2: 100 % (20 1645) Vital Signs (24h Range):  Temp:  [97.6 °F (36.4 °C)-101.2 °F (38.4 °C)] 98.3 °F (36.8 °C)  Pulse:  [] 77  Resp:  [16-20] 18  SpO2:  [100 %] 100 %  BP: (115-164)/(62-97) 125/62        There is no height or weight on file to calculate BMI.      Intake/Output Summary (Last 24 hours) at 5/10/2020 1404  Last data filed at 5/10/2020 0037  Gross per 24 hour   Intake --   Output 1175 ml   Net -1175 ml       Significant Labs:  Lab Results   Component Value Date    GROUPTRH O POS 2020    HEPBSAG Negative 10/28/2019    STREPBCULT No Group B Streptococcus isolated 2020     Recent Labs   Lab 20  0120   HGB 10.5*   HCT 33.6*       I have personallly reviewed all pertinent lab results from the last 24 hours.    Physical  Exam:   Constitutional: She is oriented to person, place, and time. She appears well-developed and well-nourished.    HENT:   Head: Normocephalic and atraumatic.    Eyes: Conjunctivae and EOM are normal.    Neck: Normal range of motion.    Cardiovascular: Normal rate, regular rhythm and normal heart sounds.  Exam reveals edema.    Mild BLE edema    Pulmonary/Chest: Effort normal and breath sounds normal. No respiratory distress.        Abdominal: Soft. Bowel sounds are normal. She exhibits no distension. There is no tenderness.     Genitourinary: Vagina normal and uterus normal.           Musculoskeletal: Normal range of motion and moves all extremeties.       Neurological: She is alert and oriented to person, place, and time. She has normal reflexes. She displays normal reflexes.   No clonus    Skin: Skin is warm and dry.    Psychiatric: She has a normal mood and affect. Her behavior is normal. Judgment and thought content normal.       Assessment/Plan:     40 y.o. female  for:    *  (spontaneous vaginal delivery)  Routine PP care  Pumping    First degree perineal laceration during delivery  Self care, analgesics as ordered    Outcome of delivery, single liveborn  Baby transferred to NICU at [facility]    Hypertension affecting pregnancy in third trimester  IOL recommended d/t pre existing HTN  Labetalol 200 mg BID  Asymptomatic      Iatrogenic hypothyroidism  synthroid        Disposition: As patient meets milestones, will plan to discharge today @ 24 hours post delivery.    Sruthi Caicedo CNM  Obstetrics  [facility] [place]

## 2020-05-10 NOTE — PLAN OF CARE
Progressing well. Fundus firm, bleeding light. Pain controlled with po analgesics. Baby at [facility] in NICU. VSS/WNLS. NAD.  Discharge planned for today.

## 2020-05-13 ENCOUNTER — PATIENT MESSAGE (OUTPATIENT)
Dept: OBSTETRICS AND GYNECOLOGY | Facility: CLINIC | Age: 40
End: 2020-05-13

## 2020-05-15 ENCOUNTER — CLINICAL SUPPORT (OUTPATIENT)
Dept: OBSTETRICS AND GYNECOLOGY | Facility: CLINIC | Age: 40
End: 2020-05-15
Payer: COMMERCIAL

## 2020-05-15 RX ORDER — LABETALOL 200 MG/1
300 TABLET, FILM COATED ORAL EVERY 12 HOURS
Qty: 90 TABLET | Refills: 11 | Status: ON HOLD | OUTPATIENT
Start: 2020-05-15 | End: 2020-05-20 | Stop reason: HOSPADM

## 2020-05-15 NOTE — PROGRESS NOTES
Here for BP check /100  On labetalol 200mg 2xd  Took dose at 6 am  Baby in NICU  Pumping for baby  Will increase dose to 300mg 2xd  If not improved in 48 hours increase to 400mg 2xd

## 2020-05-18 ENCOUNTER — PATIENT MESSAGE (OUTPATIENT)
Dept: OBSTETRICS AND GYNECOLOGY | Facility: CLINIC | Age: 40
End: 2020-05-18

## 2020-05-18 LAB
FINAL PATHOLOGIC DIAGNOSIS: NORMAL
GROSS: NORMAL

## 2020-05-19 ENCOUNTER — HOSPITAL ENCOUNTER (INPATIENT)
Facility: HOSPITAL | Age: 40
LOS: 1 days | Discharge: HOME | DRG: 776 | End: 2020-05-20
Attending: OBSTETRICS & GYNECOLOGY | Admitting: OBSTETRICS & GYNECOLOGY
Payer: COMMERCIAL

## 2020-05-19 ENCOUNTER — POSTPARTUM VISIT (OUTPATIENT)
Dept: OBSTETRICS AND GYNECOLOGY | Facility: CLINIC | Age: 40
End: 2020-05-19
Payer: COMMERCIAL

## 2020-05-19 VITALS
DIASTOLIC BLOOD PRESSURE: 90 MMHG | BODY MASS INDEX: 30.49 KG/M2 | HEIGHT: 59 IN | WEIGHT: 151.25 LBS | SYSTOLIC BLOOD PRESSURE: 152 MMHG

## 2020-05-19 DIAGNOSIS — R03.0 ELEVATED BLOOD PRESSURE READING: ICD-10-CM

## 2020-05-19 PROBLEM — O28.9 ABNORMAL ANTENATAL TEST: Status: RESOLVED | Noted: 2020-04-24 | Resolved: 2020-05-19

## 2020-05-19 PROBLEM — O99.810 ABNORMAL GLUCOSE IN PREGNANCY, ANTEPARTUM: Status: RESOLVED | Noted: 2020-03-09 | Resolved: 2020-05-19

## 2020-05-19 PROBLEM — O16.3 HYPERTENSION AFFECTING PREGNANCY IN THIRD TRIMESTER: Status: RESOLVED | Noted: 2020-05-09 | Resolved: 2020-05-19

## 2020-05-19 PROBLEM — O09.529 ELDERLY MULTIGRAVIDA, CURRENTLY PREGNANT: Status: RESOLVED | Noted: 2020-01-03 | Resolved: 2020-05-19

## 2020-05-19 PROBLEM — O10.919 PREEXISTING HYPERTENSION COMPLICATING PREGNANCY, ANTEPARTUM: Status: ACTIVE | Noted: 2020-05-19

## 2020-05-19 LAB
ALBUMIN SERPL BCP-MCNC: 3.7 G/DL (ref 3.5–5.2)
ALP SERPL-CCNC: 95 U/L (ref 55–135)
ALT SERPL W/O P-5'-P-CCNC: 29 U/L (ref 10–44)
ANION GAP SERPL CALC-SCNC: 13 MMOL/L (ref 8–16)
AST SERPL-CCNC: 32 U/L (ref 10–40)
BASOPHILS # BLD AUTO: 0.04 K/UL (ref 0–0.2)
BASOPHILS NFR BLD: 0.6 % (ref 0–1.9)
BILIRUB SERPL-MCNC: 0.3 MG/DL (ref 0.1–1)
BILIRUB UR QL STRIP: NEGATIVE
BUN SERPL-MCNC: 13 MG/DL (ref 6–20)
CALCIUM SERPL-MCNC: 9.5 MG/DL (ref 8.7–10.5)
CHLORIDE SERPL-SCNC: 106 MMOL/L (ref 95–110)
CLARITY UR: CLEAR
CO2 SERPL-SCNC: 22 MMOL/L (ref 23–29)
COLOR UR: YELLOW
CREAT SERPL-MCNC: 1 MG/DL (ref 0.5–1.4)
CREAT UR-MCNC: 125.6 MG/DL (ref 15–325)
DIFFERENTIAL METHOD: ABNORMAL
EOSINOPHIL # BLD AUTO: 0.2 K/UL (ref 0–0.5)
EOSINOPHIL NFR BLD: 2.5 % (ref 0–8)
ERYTHROCYTE [DISTWIDTH] IN BLOOD BY AUTOMATED COUNT: 13.9 % (ref 11.5–14.5)
EST. GFR  (AFRICAN AMERICAN): >60 ML/MIN/1.73 M^2
EST. GFR  (NON AFRICAN AMERICAN): >60 ML/MIN/1.73 M^2
GLUCOSE SERPL-MCNC: 96 MG/DL (ref 70–110)
GLUCOSE UR QL STRIP: NEGATIVE
HCT VFR BLD AUTO: 35.2 % (ref 37–48.5)
HGB BLD-MCNC: 11.1 G/DL (ref 12–16)
HGB UR QL STRIP: ABNORMAL
IMM GRANULOCYTES # BLD AUTO: 0.03 K/UL (ref 0–0.04)
IMM GRANULOCYTES NFR BLD AUTO: 0.4 % (ref 0–0.5)
KETONES UR QL STRIP: NEGATIVE
LEUKOCYTE ESTERASE UR QL STRIP: NEGATIVE
LYMPHOCYTES # BLD AUTO: 2.1 K/UL (ref 1–4.8)
LYMPHOCYTES NFR BLD: 30.8 % (ref 18–48)
MCH RBC QN AUTO: 27.6 PG (ref 27–31)
MCHC RBC AUTO-ENTMCNC: 31.5 G/DL (ref 32–36)
MCV RBC AUTO: 88 FL (ref 82–98)
MICROSCOPIC COMMENT: NORMAL
MONOCYTES # BLD AUTO: 0.8 K/UL (ref 0.3–1)
MONOCYTES NFR BLD: 11.1 % (ref 4–15)
NEUTROPHILS # BLD AUTO: 3.7 K/UL (ref 1.8–7.7)
NEUTROPHILS NFR BLD: 54.6 % (ref 38–73)
NITRITE UR QL STRIP: NEGATIVE
NRBC BLD-RTO: 0 /100 WBC
PH UR STRIP: 6 [PH] (ref 5–8)
PLATELET # BLD AUTO: 408 K/UL (ref 150–350)
PMV BLD AUTO: 10 FL (ref 9.2–12.9)
POTASSIUM SERPL-SCNC: 3.7 MMOL/L (ref 3.5–5.1)
PROT SERPL-MCNC: 8 G/DL (ref 6–8.4)
PROT UR QL STRIP: NEGATIVE
PROT UR-MCNC: 12 MG/DL (ref 0–15)
PROT/CREAT UR: 0.1 MG/G{CREAT} (ref 0–0.2)
RBC # BLD AUTO: 4.02 M/UL (ref 4–5.4)
RBC #/AREA URNS HPF: 0 /HPF (ref 0–4)
SARS-COV-2 RDRP RESP QL NAA+PROBE: NEGATIVE
SODIUM SERPL-SCNC: 141 MMOL/L (ref 136–145)
SP GR UR STRIP: 1.02 (ref 1–1.03)
URN SPEC COLLECT METH UR: ABNORMAL
UROBILINOGEN UR STRIP-ACNC: NEGATIVE EU/DL
WBC # BLD AUTO: 6.84 K/UL (ref 3.9–12.7)
WBC #/AREA URNS HPF: 1 /HPF (ref 0–5)

## 2020-05-19 PROCEDURE — 85025 COMPLETE CBC W/AUTO DIFF WBC: CPT

## 2020-05-19 PROCEDURE — 63600175 PHARM REV CODE 636 W HCPCS: Performed by: MIDWIFE

## 2020-05-19 PROCEDURE — 0503F POSTPARTUM CARE VISIT: CPT | Mod: S$GLB,,, | Performed by: OBSTETRICS & GYNECOLOGY

## 2020-05-19 PROCEDURE — 81000 URINALYSIS NONAUTO W/SCOPE: CPT

## 2020-05-19 PROCEDURE — 11000001 HC ACUTE MED/SURG PRIVATE ROOM

## 2020-05-19 PROCEDURE — 80053 COMPREHEN METABOLIC PANEL: CPT

## 2020-05-19 PROCEDURE — 25000003 PHARM REV CODE 250: Performed by: MIDWIFE

## 2020-05-19 PROCEDURE — U0002 COVID-19 LAB TEST NON-CDC: HCPCS

## 2020-05-19 PROCEDURE — 84156 ASSAY OF PROTEIN URINE: CPT

## 2020-05-19 PROCEDURE — 99999 PR PBB SHADOW E&M-EST. PATIENT-LVL II: CPT | Mod: PBBFAC,,, | Performed by: OBSTETRICS & GYNECOLOGY

## 2020-05-19 PROCEDURE — 99999 PR PBB SHADOW E&M-EST. PATIENT-LVL II: ICD-10-PCS | Mod: PBBFAC,,, | Performed by: OBSTETRICS & GYNECOLOGY

## 2020-05-19 PROCEDURE — 0503F PR POSTPARTUM CARE VISIT: ICD-10-PCS | Mod: S$GLB,,, | Performed by: OBSTETRICS & GYNECOLOGY

## 2020-05-19 RX ORDER — ONDANSETRON 8 MG/1
8 TABLET, ORALLY DISINTEGRATING ORAL EVERY 8 HOURS PRN
Status: DISCONTINUED | OUTPATIENT
Start: 2020-05-19 | End: 2020-05-20 | Stop reason: HOSPADM

## 2020-05-19 RX ORDER — LEVOTHYROXINE SODIUM 125 UG/1
125 TABLET ORAL
Status: DISCONTINUED | OUTPATIENT
Start: 2020-05-20 | End: 2020-05-20 | Stop reason: HOSPADM

## 2020-05-19 RX ORDER — MAGNESIUM SULFATE HEPTAHYDRATE 40 MG/ML
2 INJECTION, SOLUTION INTRAVENOUS CONTINUOUS
Status: DISCONTINUED | OUTPATIENT
Start: 2020-05-19 | End: 2020-05-20 | Stop reason: HOSPADM

## 2020-05-19 RX ORDER — HYDRALAZINE HYDROCHLORIDE 20 MG/ML
10 INJECTION INTRAMUSCULAR; INTRAVENOUS ONCE AS NEEDED
Status: DISCONTINUED | OUTPATIENT
Start: 2020-05-19 | End: 2020-05-20 | Stop reason: SDUPTHER

## 2020-05-19 RX ORDER — MAGNESIUM SULFATE HEPTAHYDRATE 40 MG/ML
4 INJECTION, SOLUTION INTRAVENOUS ONCE
Status: COMPLETED | OUTPATIENT
Start: 2020-05-19 | End: 2020-05-19

## 2020-05-19 RX ORDER — LABETALOL HYDROCHLORIDE 5 MG/ML
80 INJECTION, SOLUTION INTRAVENOUS ONCE AS NEEDED
Status: DISCONTINUED | OUTPATIENT
Start: 2020-05-19 | End: 2020-05-20 | Stop reason: HOSPADM

## 2020-05-19 RX ORDER — SODIUM CHLORIDE, SODIUM LACTATE, POTASSIUM CHLORIDE, CALCIUM CHLORIDE 600; 310; 30; 20 MG/100ML; MG/100ML; MG/100ML; MG/100ML
INJECTION, SOLUTION INTRAVENOUS CONTINUOUS
Status: DISCONTINUED | OUTPATIENT
Start: 2020-05-19 | End: 2020-05-20 | Stop reason: HOSPADM

## 2020-05-19 RX ORDER — LABETALOL HYDROCHLORIDE 5 MG/ML
20 INJECTION, SOLUTION INTRAVENOUS ONCE AS NEEDED
Status: COMPLETED | OUTPATIENT
Start: 2020-05-19 | End: 2020-05-19

## 2020-05-19 RX ORDER — ACETAMINOPHEN 500 MG
500 TABLET ORAL EVERY 6 HOURS PRN
Status: DISCONTINUED | OUTPATIENT
Start: 2020-05-19 | End: 2020-05-20 | Stop reason: HOSPADM

## 2020-05-19 RX ORDER — CALCIUM GLUCONATE 98 MG/ML
1 INJECTION, SOLUTION INTRAVENOUS
Status: DISCONTINUED | OUTPATIENT
Start: 2020-05-19 | End: 2020-05-20 | Stop reason: HOSPADM

## 2020-05-19 RX ORDER — LABETALOL HYDROCHLORIDE 5 MG/ML
40 INJECTION, SOLUTION INTRAVENOUS ONCE AS NEEDED
Status: COMPLETED | OUTPATIENT
Start: 2020-05-19 | End: 2020-05-19

## 2020-05-19 RX ADMIN — MAGNESIUM SULFATE HEPTAHYDRATE 2 G/HR: 40 INJECTION, SOLUTION INTRAVENOUS at 04:05

## 2020-05-19 RX ADMIN — MAGNESIUM SULFATE HEPTAHYDRATE 4 G: 40 INJECTION, SOLUTION INTRAVENOUS at 03:05

## 2020-05-19 RX ADMIN — SODIUM CHLORIDE, SODIUM LACTATE, POTASSIUM CHLORIDE, AND CALCIUM CHLORIDE 75 ML/HR: .6; .31; .03; .02 INJECTION, SOLUTION INTRAVENOUS at 03:05

## 2020-05-19 RX ADMIN — LABETALOL HYDROCHLORIDE 40 MG: 5 INJECTION, SOLUTION INTRAVENOUS at 07:05

## 2020-05-19 RX ADMIN — LABETALOL HYDROCHLORIDE 20 MG: 5 INJECTION, SOLUTION INTRAVENOUS at 03:05

## 2020-05-19 NOTE — SUBJECTIVE & OBJECTIVE
OB History    Para Term  AB Living   2 2 1 0 0 2   SAB TAB Ectopic Multiple Live Births   0 0 0 0 2      # Outcome Date GA Lbr Diego/2nd Weight Sex Delivery Anes PTL Lv   2 Term 20 38w2d / 00:45 2.855 kg (6 lb 4.7 oz) M Vag-Spont EPI N RAQUEL      Name: BRANDY DALTON      Apgar1: 2  Apgar5: 5   1 Para 01   2.268 kg (5 lb) M Vag-Spont None  RAQUEL     Past Medical History:   Diagnosis Date    Hypertension     Hyperthyroidism     s/p radioact thy ablation    Iatrogenic hypothyroidism      Past Surgical History:   Procedure Laterality Date    breast augmentation      radioactive ablation thyroid             PTA Medications   Medication Sig    labetaloL (NORMODYNE) 200 MG tablet Take 1.5 tablets (300 mg total) by mouth every 12 (twelve) hours.    levothyroxine (SYNTHROID) 125 MCG tablet Take 1 tablet (125 mcg total) by mouth once daily.    prenatal vit 87-iron-folic-dha (PRENATE MINI, FERR ASP GLYCIN,) 18-1-350 mg Cap Take 1 tablet by mouth once daily.       Review of patient's allergies indicates:   Allergen Reactions    Amlodipine      Heart racing        Family History     Problem Relation (Age of Onset)    Gout Father    Hyperlipidemia Mother    Hypertension Mother        Tobacco Use    Smoking status: Never Smoker    Smokeless tobacco: Never Used   Substance and Sexual Activity    Alcohol use: No    Drug use: No    Sexual activity: Yes     Partners: Male     Birth control/protection: None     Review of Systems   Constitutional: Negative for chills and fever.   Eyes: Negative for discharge.   Respiratory: Negative for shortness of breath.    Gastrointestinal: Negative for nausea and vomiting.   Endocrine: Positive for hypothyroidism.   Genitourinary: Positive for vaginal bleeding. Negative for dysuria and vaginal discharge.        Moderate rubra lochia   Neurological: Positive for headaches.        Mild resolving intermittent HA's      Objective:     Vital Signs (Most  Recent):    Vital Signs (24h Range):  BP: (152)/(90) 152/90        There is no height or weight on file to calculate BMI.    Patient's last menstrual period was 09/09/2019 (within days).    Physical Exam:   Constitutional: She is oriented to person, place, and time. She appears well-developed.    HENT:   Head: Normocephalic and atraumatic.     Neck: Normal range of motion. Neck supple.    Cardiovascular: Normal rate and regular rhythm.  Exam reveals no edema.     Pulmonary/Chest: Effort normal and breath sounds normal. No respiratory distress.        Abdominal: Soft.             Musculoskeletal: Normal range of motion and moves all extremeties.       Neurological: She is alert and oriented to person, place, and time.   DTR +3    Skin: Skin is warm and dry.    Psychiatric: She has a normal mood and affect. Thought content normal.       Laboratory:  I have personallly reviewed all pertinent lab results from the last 24 hours.

## 2020-05-19 NOTE — PROGRESS NOTES
Subjective:       Patient ID: Sabrina Ureña is a 40 y.o. female.    Chief Complaint:  Blood Pressure Check      History of Present Illness  HPI  here for bp check   S/p  2020 ; induction of labor for pre existing chronic htn  Pt was seen in office 4 days prior for bp check ; bp meds were increased to 300mg bid and pt was told to follow up today    Pt reports intermittent headaches, denies seeing spots, ruq pain  Pt does not feel she is swollen    Feels minimal lochia,   Inf[facility] doing better at [facility] nicu and may come home later in week      GYN & OB History  Patient's last menstrual period was 2019 (within days).   Date of Last Pap: 11/10/2019    OB History    Para Term  AB Living   2 2 1     2   SAB TAB Ectopic Multiple Live Births         0 2      # Outcome Date GA Lbr Diego/2nd Weight Sex Delivery Anes PTL Lv   2 Term 20 38w2d / 00:45 2.855 kg (6 lb 4.7 oz) M Vag-Spont EPI N RAQUEL   1 Para 01   2.268 kg (5 lb) M Vag-Spont None  RAQUEL       Review of Systems  Review of Systems   Neurological: Positive for headaches.   All other systems reviewed and are negative.          Objective:      Physical Exam:   Constitutional: She is oriented to person, place, and time.                           Neurological: She is oriented to person, place, and time. She displays abnormal reflex.   +hyperreflexia, 3+ beats clonus             Assessment:     Encounter Diagnoses   Name Primary?    Postpartum care following vaginal delivery     Pre-eclampsia in postpartum period Yes               Plan:      Pt sent to l&d for preE work up  Pt advised may need magnesium sulfate for 24 hrs and further management of bp  Midwife on call notified.

## 2020-05-19 NOTE — H&P
[facility] -   Obstetrics & Gynecology  History & Physical    Patient Name: Sabrina Ureña  MRN: [id number]  Admission Date: 2020  Primary Care Provider: Alexys Telles MD    Subjective:     Chief Complaint/Reason for Admission: Elevated Blood Pressure Reading     History of Present Illness:  40 year old , 10 days s/p  presents from clinic with elevated BP's        OB History    Para Term  AB Living   2 2 1 0 0 2   SAB TAB Ectopic Multiple Live Births   0 0 0 0 2      # Outcome Date GA Lbr Diego/2nd Weight Sex Delivery Anes PTL Lv   2 Term 20 38w2d / 00:45 2.855 kg (6 lb 4.7 oz) M Vag-Spont EPI N RAQUEL      Name: BRANDY UREÑA      Apgar1: 2  Apgar5: 5   1 Para 01   2.268 kg (5 lb) M Vag-Spont None  RAQUEL     Past Medical History:   Diagnosis Date    Hypertension     Hyperthyroidism     s/p radioact thy ablation    Iatrogenic hypothyroidism      Past Surgical History:   Procedure Laterality Date    breast augmentation      radioactive ablation thyroid             PTA Medications   Medication Sig    labetaloL (NORMODYNE) 200 MG tablet Take 1.5 tablets (300 mg total) by mouth every 12 (twelve) hours.    levothyroxine (SYNTHROID) 125 MCG tablet Take 1 tablet (125 mcg total) by mouth once daily.    prenatal vit 87-iron-folic-dha (PRENATE MINI, FERR ASP GLYCIN,) 18-1-350 mg Cap Take 1 tablet by mouth once daily.       Review of patient's allergies indicates:   Allergen Reactions    Amlodipine      Heart racing        Family History     Problem Relation (Age of Onset)    Gout Father    Hyperlipidemia Mother    Hypertension Mother        Tobacco Use    Smoking status: Never Smoker    Smokeless tobacco: Never Used   Substance and Sexual Activity    Alcohol use: No    Drug use: No    Sexual activity: Yes     Partners: Male     Birth control/protection: None     Review of Systems   Constitutional: Negative for chills and fever.   Eyes: Negative for  discharge.   Respiratory: Negative for shortness of breath.    Gastrointestinal: Negative for nausea and vomiting.   Endocrine: Positive for hypothyroidism.   Genitourinary: Positive for vaginal bleeding. Negative for dysuria and vaginal discharge.        Moderate rubra lochia   Neurological: Positive for headaches.        Mild resolving intermittent HA's      Objective:     Vital Signs (Most Recent):    Vital Signs (24h Range):  BP: (152)/(90) 152/90        There is no height or weight on file to calculate BMI.    Patient's last menstrual period was 09/09/2019 (within days).    Physical Exam:   Constitutional: She is oriented to person, place, and time. She appears well-developed.    HENT:   Head: Normocephalic and atraumatic.     Neck: Normal range of motion. Neck supple.    Cardiovascular: Normal rate and regular rhythm.  Exam reveals no edema.     Pulmonary/Chest: Effort normal and breath sounds normal. No respiratory distress.        Abdominal: Soft.             Musculoskeletal: Normal range of motion and moves all extremeties.       Neurological: She is alert and oriented to person, place, and time.   DTR +3    Skin: Skin is warm and dry.    Psychiatric: She has a normal mood and affect. Thought content normal.       Laboratory:  I have personallly reviewed all pertinent lab results from the last 24 hours.      Assessment/Plan:     * Elevated blood pressure reading  Serial BP  UA, CBC, CMP, PCR    SUSAN Grider CNM  Obstetrics & Gynecology  [facility] [place]

## 2020-05-19 NOTE — INTERVAL H&P NOTE
The patient has been examined and the H&P has been reviewed:    I concur with the findings and changes have been noted since the H&P was written: BP's elevated in severe range, will admit for IV anti-hypertensives and MgSO4 for 24 hours, Dr Dupont notified of admission         Active Hospital Problems    Diagnosis  POA    *Elevated blood pressure reading [R03.0]  No    Preeclampsia in postpartum period [O14.95]  Yes      Resolved Hospital Problems   No resolved problems to display.

## 2020-05-19 NOTE — HOSPITAL COURSE
Pt was admitted for management of post-partum CHTN with super-imposed pre-eclampsia.  Pt was started on Mg and Labetalol dosing was adjusted.  Pt completed the Mg well.  BP remained elevated requiring further adjustment of Labetalol to 400 mg TID.  On the PM of HD # 2, pt requested discharge as her baby was being discharged from .  Although BP was not at goal yet, pt was stable and vowed to check her BP at home.  Pt was advised to check in with her MD in the next 1-2 days with her home BP readings.  Pt was also counseled on warning signs and on indications for reporting to the ER.

## 2020-05-20 VITALS
DIASTOLIC BLOOD PRESSURE: 81 MMHG | OXYGEN SATURATION: 99 % | SYSTOLIC BLOOD PRESSURE: 152 MMHG | RESPIRATION RATE: 18 BRPM | TEMPERATURE: 99 F | HEART RATE: 72 BPM

## 2020-05-20 PROBLEM — R03.0 ELEVATED BLOOD PRESSURE READING: Status: RESOLVED | Noted: 2020-05-19 | Resolved: 2020-05-20

## 2020-05-20 PROCEDURE — 25000003 PHARM REV CODE 250: Performed by: OBSTETRICS & GYNECOLOGY

## 2020-05-20 PROCEDURE — 99232 PR SUBSEQUENT HOSPITAL CARE,LEVL II: ICD-10-PCS | Mod: ,,, | Performed by: OBSTETRICS & GYNECOLOGY

## 2020-05-20 PROCEDURE — 99232 SBSQ HOSP IP/OBS MODERATE 35: CPT | Mod: ,,, | Performed by: OBSTETRICS & GYNECOLOGY

## 2020-05-20 PROCEDURE — 63600175 PHARM REV CODE 636 W HCPCS: Performed by: MIDWIFE

## 2020-05-20 PROCEDURE — 25000003 PHARM REV CODE 250: Performed by: MIDWIFE

## 2020-05-20 RX ORDER — IBUPROFEN 600 MG/1
600 TABLET ORAL EVERY 6 HOURS PRN
Status: DISCONTINUED | OUTPATIENT
Start: 2020-05-20 | End: 2020-05-20 | Stop reason: HOSPADM

## 2020-05-20 RX ORDER — LABETALOL 200 MG/1
400 TABLET, FILM COATED ORAL 3 TIMES DAILY
Qty: 90 TABLET | Refills: 1 | Status: SHIPPED | OUTPATIENT
Start: 2020-05-20 | End: 2021-06-08

## 2020-05-20 RX ORDER — HYDROXYZINE PAMOATE 25 MG/1
50 CAPSULE ORAL ONCE
Status: COMPLETED | OUTPATIENT
Start: 2020-05-20 | End: 2020-05-20

## 2020-05-20 RX ORDER — LABETALOL 200 MG/1
400 TABLET, FILM COATED ORAL EVERY 12 HOURS
Status: DISCONTINUED | OUTPATIENT
Start: 2020-05-20 | End: 2020-05-20

## 2020-05-20 RX ORDER — LABETALOL HYDROCHLORIDE 5 MG/ML
80 INJECTION, SOLUTION INTRAVENOUS ONCE AS NEEDED
Status: DISCONTINUED | OUTPATIENT
Start: 2020-05-20 | End: 2020-05-20 | Stop reason: HOSPADM

## 2020-05-20 RX ORDER — LABETALOL HYDROCHLORIDE 5 MG/ML
20 INJECTION, SOLUTION INTRAVENOUS ONCE AS NEEDED
Status: COMPLETED | OUTPATIENT
Start: 2020-05-20 | End: 2020-05-20

## 2020-05-20 RX ORDER — LABETALOL 200 MG/1
400 TABLET, FILM COATED ORAL 3 TIMES DAILY
Status: DISCONTINUED | OUTPATIENT
Start: 2020-05-20 | End: 2020-05-20 | Stop reason: HOSPADM

## 2020-05-20 RX ORDER — LABETALOL HYDROCHLORIDE 5 MG/ML
40 INJECTION, SOLUTION INTRAVENOUS ONCE AS NEEDED
Status: COMPLETED | OUTPATIENT
Start: 2020-05-20 | End: 2020-05-20

## 2020-05-20 RX ORDER — HYDRALAZINE HYDROCHLORIDE 20 MG/ML
10 INJECTION INTRAMUSCULAR; INTRAVENOUS ONCE AS NEEDED
Status: DISCONTINUED | OUTPATIENT
Start: 2020-05-20 | End: 2020-05-20 | Stop reason: HOSPADM

## 2020-05-20 RX ADMIN — LABETALOL HYDROCHLORIDE 20 MG: 5 INJECTION, SOLUTION INTRAVENOUS at 10:05

## 2020-05-20 RX ADMIN — LABETALOL HYDROCHLORIDE 400 MG: 200 TABLET, FILM COATED ORAL at 08:05

## 2020-05-20 RX ADMIN — SODIUM CHLORIDE, SODIUM LACTATE, POTASSIUM CHLORIDE, AND CALCIUM CHLORIDE: .6; .31; .03; .02 INJECTION, SOLUTION INTRAVENOUS at 01:05

## 2020-05-20 RX ADMIN — LABETALOL HYDROCHLORIDE 400 MG: 200 TABLET, FILM COATED ORAL at 05:05

## 2020-05-20 RX ADMIN — LEVOTHYROXINE SODIUM 125 MCG: 0.12 TABLET ORAL at 08:05

## 2020-05-20 RX ADMIN — IBUPROFEN 600 MG: 600 TABLET, FILM COATED ORAL at 01:05

## 2020-05-20 RX ADMIN — IBUPROFEN 600 MG: 600 TABLET, FILM COATED ORAL at 10:05

## 2020-05-20 RX ADMIN — MAGNESIUM SULFATE HEPTAHYDRATE 2 G/HR: 40 INJECTION, SOLUTION INTRAVENOUS at 10:05

## 2020-05-20 RX ADMIN — HYDROXYZINE PAMOATE 50 MG: 25 CAPSULE ORAL at 02:05

## 2020-05-20 RX ADMIN — LABETALOL HYDROCHLORIDE 40 MG: 5 INJECTION, SOLUTION INTRAVENOUS at 10:05

## 2020-05-20 NOTE — PROGRESS NOTES
[facility]  Obstetrics & Gynecology  Progress Note    Patient Name: Sabrina Ureña  MRN: [id number]  Admission Date: 2020  Primary Care Provider: Alexys Telles MD  Principal Problem: Elevated blood pressure reading    Subjective:     HPI:  40 year old , 10 days s/p  presents from clinic with elevated BP's    No new subjective & objective note has been filed under this hospital service since the last note was generated.    Assessment/Plan:     * Elevated blood pressure reading  Serial BP  UA, CBC, CMP, PCR    Preeclampsia in postpartum period  Patient seen and examined in the office earlier today  pcr is normal  However, given severe range bp despite oral labetalol and hyperreflexia, agree with magnesium sulfate for 24 and bp control    Iatrogenic hypothyroidism  Continue synthroid         Kat Dupont MD  Obstetrics & Gynecology  [facility]

## 2020-05-20 NOTE — NURSING
Spoke with patient. PAtient reports that she wants to be able to leave so she can go  her  from the [facility] NICU. Dr. Cartagena aware and reports that if BP is less than 160/110 patient okay to  Be discharged home.

## 2020-05-20 NOTE — DISCHARGE INSTRUCTIONS
"Mother Self Care:    Activity: Avoid strenuous exercise and get adequate rest.  No driving until the physician consent given.  Emotional Changes: Most women find birth to be a time of great emotional upheaval.  Sense of loss, mood swings, fatigue, anxiety, and feeling "let down" are common.  If feelings worsen or last more than a week, call your physician.  Breast Care/Breastfeeding: Wear a bra for comfort.  Keep nipples dry and apply your own breast milk or lanolin cream as needed for soreness.  Engorgement can be relieved with warm, moist heat before feedings.  You may also take Ibuprofen.  Breast Care  Yessy-Care/Vaginal Bleeding: Remember to use your yessy-bottle after urinating.  Your flow will change from red, to pink, to yellow/white color over a period of 2 weeks.  Menstruation will return in 3-8 weeks, or longer if breastfeeding.  Episiotomy Vaginal Delivery: Stitches will dissolve within 10 days to 3 weeks.  Warm baths, tucks, and dermoplast will promote healing.  Avoid bubble baths or strong soaps.  Sexual Activity/Pelvic Rest: No sexual activity, tampons, or douching until your physician gives you consent.  Diet: Continue to eat from the five basic food groups, including plenty of protein, fruits, vegetables, and whole grains.  Limit empty calories and high fat foods.  Drink enough fluids to satisfy thirst and add an extra 500 calories for breastfeeding.  Constipation/Hemorrhoids: Drink plenty of water.  You may take a stool softener or natural laxative (Metamucil). You may use tucks or hemorrhoid ointment and soak in a warm tub.    CALL YOUR OB DOCTOR IF ANY OF THE FOLLOWING OCCURS:  *Heavy bleeding - saturating a pad an hour or passing any large (2-3 inches in size) blood clots.  *Any pain, redness, or tenderness in lower leg.  *You cannot care for yourself or your baby.  *Any signs of infection-      - Temperature greater than 100.5 degrees F      - Foul smelling vaginal discharge and/or incisional " drainage      - Increased episiotomy or incisional pain      - Hot, hard, red or sore area on breast      - Flu-like symptoms      - Any urgency, frequency or burning with urination    Return To the Hospital for further Evaluation:  · Headache not relieved by tylenol or ibuprofen  · Blurry vision, double vision, seeing spots, or flashing lights  · Feeling faint or passing out  · Right epigastric pain  · Difficulty breathing  · Swelling in hands, face, or feet  · Any of these symptoms accompanied by nausea/vomiting  · Gaining more than 5 pounds in one week  · Seizures  These symptoms could be an indication of elevated blood pressure.       If you have any questions that need to be answered immediately please call the Labor & Delivery Unit at [phone] and ask to speak to a nurse.

## 2020-05-20 NOTE — DISCHARGE SUMMARY
[facility]   Obstetrics & Gynecology  Discharge Summary    Patient Name: Sabrina Ureña  MRN: [id number]  Admission Date: 2020  Hospital Length of Stay: 1 days  Discharge Date and Time:  2020 5:58 PM  Attending Physician: Kat Dupont MD   Discharging Provider: Tanner Ochoa MD  Primary Care Provider: Alexys Telles MD    HPI:  40 year old , 10 days s/p  presents from clinic with elevated BP's    Hospital Course:  Pt was admitted for management of post-partum CHTN with super-imposed pre-eclampsia.  Pt was started on Mg and Labetalol dosing was adjusted.  Pt completed the Mg well.  BP remained elevated requiring further adjustment of Labetalol to 400 mg TID.  On the PM of HD # 2, pt requested discharge as her baby was being discharged from .  Although BP was not at goal yet, pt was stable and vowed to check her BP at home.  Pt was advised to check in with her MD in the next 1-2 days with her home BP readings.  Pt was also counseled on warning signs and on indications for reporting to the ER.    * No surgery found *         Significant Diagnostic Studies: Labs: All labs within the past 24 hours have been reviewed      Pending Diagnostic Studies:     None        Final Active Diagnoses:    Diagnosis Date Noted POA    PRINCIPAL PROBLEM:  Preeclampsia in postpartum period [O14.95] 2020 Yes    Preexisting hypertension complicating pregnancy, antepartum [O10.919] 2020 Yes    Iatrogenic hypothyroidism [E03.2]  Yes      Problems Resolved During this Admission:    Diagnosis Date Noted Date Resolved POA    Elevated blood pressure reading [R03.0] 2020 No        Discharged Condition: stable    Disposition:     Follow Up:  Follow-up Information     Sloane Maki MD.    Specialties:  Obstetrics, Obstetrics and Gynecology  Contact information:  [address] [place] [address] [phone]                 Patient Instructions:      Other  restrictions (specify):   Scheduling Instructions: Light activity x 4 weeks     Notify your health care provider if you experience any of the following:  increased confusion or weakness     Notify your health care provider if you experience any of the following:  persistent dizziness, light-headedness, or visual disturbances     Notify your health care provider if you experience any of the following:  worsening rash     Notify your health care provider if you experience any of the following:  severe persistent headache     Notify your health care provider if you experience any of the following:  difficulty breathing or increased cough     Notify your health care provider if you experience any of the following:  severe uncontrolled pain     Notify your health care provider if you experience any of the following:  persistent nausea and vomiting or diarrhea     Notify your health care provider if you experience any of the following:  temperature >100.4     Medications:  Reconciled Home Medications:      Medication List      CHANGE how you take these medications    labetaloL 200 MG tablet  Commonly known as:  NORMODYNE  Take 2 tablets (400 mg total) by mouth 3 (three) times daily.  What changed:    · how much to take  · when to take this        CONTINUE taking these medications    levothyroxine 125 MCG tablet  Commonly known as:  SYNTHROID  Take 1 tablet (125 mcg total) by mouth once daily.     prenatal vit 87-iron-folic-dha 18-1-350 mg Cap  Commonly known as:  PRENATE MINI (FERR ASP GLYCIN)  Take 1 tablet by mouth once daily.            Tanner Ochoa MD  Obstetrics & Gynecology  [facility]

## 2020-05-20 NOTE — NURSING
Assisted patient to the bathroom, reports a little dizzy when moves too quickly, but fine when she is sitting down. Informed her that the magnesium may take a few hours after turning off before feeling back to normal. Informed her to call labor and delivery if symptoms do not get better and verbalizes understanding. Also informed her to call labor and delivery or physician if patient has a headache not relieved by tylenol, visual disturbances, RUQ pain, seizures, increase in blood pressure. Patient verbalizes understanding.

## 2020-05-20 NOTE — PROGRESS NOTES
Called secondary to pt requesting discharge as her baby is being discharged from .  BP on current regimen have remained mostly in the mild range but have occasional spike to severe range.  Pt was switched to Labetalol 400 BID dose this AM and completed Mg.  Pt denies any symptoms.  Pt was advised on need for close monitoring of BP and medication adjustment.  Risks of persistent elevated BP were discussed and recommendation to stay overnight for further treatment was made.  Pt voiced understanding and again requests discharge this PM.  Pt promises to take medications and prescribed and states that she will check in with her MD tomorrow.  Plan to increase Labetalol to 400 mg TID.  Pt also counseled on warning signs and indications for reporting to the ER.

## 2020-05-20 NOTE — ASSESSMENT & PLAN NOTE
Pt stable on Mg.  BP remain mild range on medications.  Will increase to Labetalol 400 BID and monitor.  Anticipate completion of Mg this PM.  Pt was counseled on management plan and discharge goals.  If BP stable after completion of Mg, will consider discharge this PM.

## 2020-05-20 NOTE — PROGRESS NOTES
05/20/20 1158   TeleVishal Jara Note - Strip   Strip Reviewed by Marek Nurse? Yes   TeleStork Marek Note - Communication   [place] Nurse Communicated with Bedside Nurse Regarding: Fetal Status   TeleVishal Jara Note - Notification   Nurse Notified? Yes  (nurse at bedside)

## 2020-05-20 NOTE — LACTATION NOTE
Visited mother at bedside. States that her son is doing great and will be discharged from NICU today!  She has been pumping and discarding her milk from fear of the Magnesium sulfate infusion. Reassured mother about the safety of Mg infusion- mother verbalized understanding and will now safe her breast milk for her son.   Availability given to mother, will follow as needed.

## 2020-05-20 NOTE — ASSESSMENT & PLAN NOTE
Patient seen and examined in the office earlier today  pcr is normal  However, given severe range bp despite oral labetalol and hyperreflexia, agree with magnesium sulfate for 24 and bp control

## 2020-05-20 NOTE — SUBJECTIVE & OBJECTIVE
Interval History:   Pt reports feeling better and is doing well this AM.  Pt received news that her baby is being released from the NICU at  today and would like to go home today.    Scheduled Meds:   labetaloL  400 mg Oral Q12H    levothyroxine  125 mcg Oral Before breakfast     Continuous Infusions:   lactated ringers 75 mL/hr at 05/20/20 0137    magnesium sulfate in water 2 g/hr (05/19/20 1600)     PRN Meds:acetaminophen, calcium gluconate, hydrALAZINE, ibuprofen, labetalol, ondansetron, promethazine (PHENERGAN) IVPB    Review of patient's allergies indicates:   Allergen Reactions    Amlodipine      Heart racing       Objective:     Vital Signs (Most Recent):  Temp: 98.7 °F (37.1 °C) (05/19/20 1435)  Pulse: 74 (05/20/20 0816)  Resp: 20 (05/19/20 1435)  BP: (!) 168/78 (05/20/20 0816)  SpO2: 98 % (05/20/20 0700) Vital Signs (24h Range):  Temp:  [98.7 °F (37.1 °C)] 98.7 °F (37.1 °C)  Pulse:  [57-77] 74  Resp:  [20] 20  SpO2:  [96 %-99 %] 98 %  BP: (127-179)/(75-98) 168/78        There is no height or weight on file to calculate BMI.  Patient's last menstrual period was 09/09/2019 (within days).    I&O (Last 24H):    Intake/Output Summary (Last 24 hours) at 5/20/2020 0911  Last data filed at 5/20/2020 0900  Gross per 24 hour   Intake 1992.5 ml   Output 2385 ml   Net -392.5 ml       Physical Exam:   Constitutional: She is oriented to person, place, and time. She appears well-developed and well-nourished. No distress.       Cardiovascular: Normal rate, regular rhythm and normal heart sounds.     Pulmonary/Chest: Effort normal and breath sounds normal.        Abdominal: Soft. Bowel sounds are normal. She exhibits no distension. There is no tenderness.             Musculoskeletal: Normal range of motion and moves all extremeties. She exhibits no edema or tenderness.       Neurological: She is alert and oriented to person, place, and time.    Skin: Skin is warm and dry.    Psychiatric: She has a normal mood and  affect. Her behavior is normal. Thought content normal.       Laboratory:  I have personallly reviewed all pertinent lab results from the last 24 hours.    Diagnostic Results:  Labs: Reviewed

## 2020-05-20 NOTE — PROGRESS NOTES
[facility]  Obstetrics & Gynecology  Progress Note    Patient Name: Sabrina Ureña  MRN: [id number]  Admission Date: 2020  Primary Care Provider: Alexys Telles MD  Principal Problem: Preeclampsia in postpartum period    Subjective:     HPI:  40 year old , 10 days s/p  presents from clinic with elevated BP's    Interval History:   Pt reports feeling better and is doing well this AM.  Pt received news that her baby is being released from the NICU at  today and would like to go home today.    Scheduled Meds:   labetaloL  400 mg Oral Q12H    levothyroxine  125 mcg Oral Before breakfast     Continuous Infusions:   lactated ringers 75 mL/hr at 20 0137    magnesium sulfate in water 2 g/hr (20 1600)     PRN Meds:acetaminophen, calcium gluconate, hydrALAZINE, ibuprofen, labetalol, ondansetron, promethazine (PHENERGAN) IVPB    Review of patient's allergies indicates:   Allergen Reactions    Amlodipine      Heart racing       Objective:     Vital Signs (Most Recent):  Temp: 98.7 °F (37.1 °C) (20 1435)  Pulse: 74 (20 0816)  Resp: 20 (20 1435)  BP: (!) 168/78 (20 0816)  SpO2: 98 % (20 0700) Vital Signs (24h Range):  Temp:  [98.7 °F (37.1 °C)] 98.7 °F (37.1 °C)  Pulse:  [57-77] 74  Resp:  [20] 20  SpO2:  [96 %-99 %] 98 %  BP: (127-179)/(75-98) 168/78        There is no height or weight on file to calculate BMI.  Patient's last menstrual period was 2019 (within days).    I&O (Last 24H):    Intake/Output Summary (Last 24 hours) at 2020 0911  Last data filed at 2020 0900  Gross per 24 hour   Intake 1992.5 ml   Output 2385 ml   Net -392.5 ml       Physical Exam:   Constitutional: She is oriented to person, place, and time. She appears well-developed and well-nourished. No distress.       Cardiovascular: Normal rate, regular rhythm and normal heart sounds.     Pulmonary/Chest: Effort normal and breath sounds normal.        Abdominal:  Soft. Bowel sounds are normal. She exhibits no distension. There is no tenderness.             Musculoskeletal: Normal range of motion and moves all extremeties. She exhibits no edema or tenderness.       Neurological: She is alert and oriented to person, place, and time.    Skin: Skin is warm and dry.    Psychiatric: She has a normal mood and affect. Her behavior is normal. Thought content normal.       Laboratory:  I have personallly reviewed all pertinent lab results from the last 24 hours.    Diagnostic Results:  Labs: Reviewed    Assessment/Plan:     * Preeclampsia in postpartum period  Pt stable on Mg.  BP remain mild range on medications.  Will increase to Labetalol 400 BID and monitor.  Anticipate completion of Mg this PM.  Pt was counseled on management plan and discharge goals.  If BP stable after completion of Mg, will consider discharge this PM.    Iatrogenic hypothyroidism  Continue synthroid         Tanner Ochoa MD  Obstetrics & Gynecology  [facility]

## 2020-05-22 ENCOUNTER — TELEPHONE (OUTPATIENT)
Dept: OBSTETRICS AND GYNECOLOGY | Facility: CLINIC | Age: 40
End: 2020-05-22

## 2020-05-22 NOTE — TELEPHONE ENCOUNTER
----- Message from Izabella Kelley sent at 5/22/2020 10:46 AM CDT -----  ..Type:  Patient Returning Call    Who Called:Zeynep ( [facility] )  Who Left Message for Patient:  Does the patient know what this is regarding?: update   Would the patient rather a call back or a response via [facility]? Call back   Best Call Back Number: [phone]  Additional Information: Zeynep ( [facility] ) is requesting a call from nurse to get up dated clinical notes

## 2020-05-26 ENCOUNTER — PATIENT MESSAGE (OUTPATIENT)
Dept: OBSTETRICS AND GYNECOLOGY | Facility: CLINIC | Age: 40
End: 2020-05-26

## 2020-06-08 ENCOUNTER — TELEPHONE (OUTPATIENT)
Dept: INTERNAL MEDICINE | Facility: CLINIC | Age: 40
End: 2020-06-08

## 2020-06-08 ENCOUNTER — TELEPHONE (OUTPATIENT)
Dept: OBSTETRICS AND GYNECOLOGY | Facility: CLINIC | Age: 40
End: 2020-06-08

## 2020-06-08 ENCOUNTER — POSTPARTUM VISIT (OUTPATIENT)
Dept: OBSTETRICS AND GYNECOLOGY | Facility: CLINIC | Age: 40
End: 2020-06-08
Payer: COMMERCIAL

## 2020-06-08 VITALS
DIASTOLIC BLOOD PRESSURE: 88 MMHG | WEIGHT: 145.31 LBS | SYSTOLIC BLOOD PRESSURE: 136 MMHG | BODY MASS INDEX: 29.34 KG/M2

## 2020-06-08 PROCEDURE — 99999 PR PBB SHADOW E&M-EST. PATIENT-LVL II: ICD-10-PCS | Mod: PBBFAC,,, | Performed by: ADVANCED PRACTICE MIDWIFE

## 2020-06-08 PROCEDURE — 0503F POSTPARTUM CARE VISIT: CPT | Mod: S$GLB,,, | Performed by: ADVANCED PRACTICE MIDWIFE

## 2020-06-08 PROCEDURE — 0503F PR POSTPARTUM CARE VISIT: ICD-10-PCS | Mod: S$GLB,,, | Performed by: ADVANCED PRACTICE MIDWIFE

## 2020-06-08 PROCEDURE — 99999 PR PBB SHADOW E&M-EST. PATIENT-LVL II: CPT | Mod: PBBFAC,,, | Performed by: ADVANCED PRACTICE MIDWIFE

## 2020-06-08 NOTE — TELEPHONE ENCOUNTER
Ijeoma would like this patient to have an appointment with Dr. Telles to follow up on her blood pressure. Can you assist with scheduling an appointment for her?

## 2020-06-08 NOTE — PROGRESS NOTES
Subjective:       Sabrina Ureña is a 40 y.o. female who presents for a postpartum visit. She is 4 weeks postpartum following a spontaneous vaginal delivery. I have fully reviewed the prenatal and intrapartum course. The delivery was at 38 gestational weeks. Outcome: spontaneous vaginal delivery. Anesthesia: epidural. Postpartum course has been normal. Baby's course has been normal. Baby is feeding by bottle - Similac Advance. Bleeding no bleeding. Bowel function is normal. Bladder function is normal. Patient is not sexually active. Contraception method is oral progesterone-only contraceptive. Postpartum depression screening: negative.    The following portions of the patient's history were reviewed and updated as appropriate: allergies, current medications, past family history, past medical history, past social history, past surgical history and problem list.    Review of Systems  A comprehensive review of systems was negative.     Objective:      /88   Wt 65.9 kg (145 lb 4.5 oz)   LMP 09/09/2019 (Within Days)   BMI 29.34 kg/m²    General:  alert, appears stated age and cooperative    Breasts:  inspection negative, no nipple discharge or bleeding, no masses or nodularity palpable   Lungs: clear to auscultation bilaterally   Heart:  regular rate and rhythm, S1, S2 normal, no murmur, click, rub or gallop   Abdomen: soft, non-tender; bowel sounds normal; no masses,  no organomegaly    Vulva:  normal   Vagina: normal vagina   Cervix:  anteverted, no cervical motion tenderness and no lesions   Corpus: normal size, contour, position, consistency, mobility, non-tender   Adnexa:  normal adnexa   Rectal Exam: Not performed.          Assessment:       normal postpartum exam. Pap smear not done at today's visit.     Plan:      1. Contraception: oral progesterone-only contraceptive  2. Follow up with primary care doctor to manage BP  3. Follow up in: 1 year or as needed.

## 2020-06-09 RX ORDER — ACETAMINOPHEN AND CODEINE PHOSPHATE 120; 12 MG/5ML; MG/5ML
1 SOLUTION ORAL DAILY
Qty: 30 TABLET | Refills: 11 | Status: SHIPPED | OUTPATIENT
Start: 2020-06-09 | End: 2020-10-26

## 2020-06-12 ENCOUNTER — OFFICE VISIT (OUTPATIENT)
Dept: INTERNAL MEDICINE | Facility: CLINIC | Age: 40
End: 2020-06-12
Payer: COMMERCIAL

## 2020-06-12 VITALS
WEIGHT: 142.63 LBS | SYSTOLIC BLOOD PRESSURE: 132 MMHG | HEIGHT: 59 IN | BODY MASS INDEX: 28.76 KG/M2 | DIASTOLIC BLOOD PRESSURE: 86 MMHG | TEMPERATURE: 98 F | HEART RATE: 60 BPM | OXYGEN SATURATION: 95 %

## 2020-06-12 DIAGNOSIS — I10 ESSENTIAL HYPERTENSION: Primary | ICD-10-CM

## 2020-06-12 PROBLEM — O10.919 PREEXISTING HYPERTENSION COMPLICATING PREGNANCY, ANTEPARTUM: Status: RESOLVED | Noted: 2020-05-19 | Resolved: 2020-06-12

## 2020-06-12 PROCEDURE — 99999 PR PBB SHADOW E&M-EST. PATIENT-LVL III: ICD-10-PCS | Mod: PBBFAC,,, | Performed by: PHYSICIAN ASSISTANT

## 2020-06-12 PROCEDURE — 99214 PR OFFICE/OUTPT VISIT, EST, LEVL IV, 30-39 MIN: ICD-10-PCS | Mod: S$GLB,,, | Performed by: PHYSICIAN ASSISTANT

## 2020-06-12 PROCEDURE — 99214 OFFICE O/P EST MOD 30 MIN: CPT | Mod: S$GLB,,, | Performed by: PHYSICIAN ASSISTANT

## 2020-06-12 PROCEDURE — 99999 PR PBB SHADOW E&M-EST. PATIENT-LVL III: CPT | Mod: PBBFAC,,, | Performed by: PHYSICIAN ASSISTANT

## 2020-06-12 RX ORDER — LOSARTAN POTASSIUM AND HYDROCHLOROTHIAZIDE 12.5; 1 MG/1; MG/1
1 TABLET ORAL DAILY
Qty: 30 TABLET | Refills: 0 | Status: SHIPPED | OUTPATIENT
Start: 2020-06-12 | End: 2020-07-06

## 2020-06-12 NOTE — PATIENT INSTRUCTIONS

## 2020-06-12 NOTE — PROGRESS NOTES
Subjective:      Patient ID: Sabrina Ureña is a 40 y.o. female.    Chief Complaint: Hypertension (follow up )    Hypertension   This is a new problem. The current episode started more than 1 month ago. The problem is uncontrolled. Pertinent negatives include no anxiety, blurred vision, chest pain, headaches, malaise/fatigue, neck pain, orthopnea, palpitations, peripheral edema, PND, shortness of breath or sweats. There are no associated agents to hypertension.   1 month post partum  Patient Active Problem List   Diagnosis    Essential hypertension    Iatrogenic hypothyroidism         Review of Systems   Constitutional: Negative for activity change, appetite change, chills, diaphoresis, fatigue, fever, malaise/fatigue and unexpected weight change.   HENT: Negative.  Negative for congestion, hearing loss, postnasal drip, rhinorrhea, sore throat, trouble swallowing and voice change.    Eyes: Negative.  Negative for blurred vision and visual disturbance.   Respiratory: Negative.  Negative for cough, choking, chest tightness and shortness of breath.    Cardiovascular: Negative for chest pain, palpitations, orthopnea, leg swelling and PND.   Gastrointestinal: Negative for abdominal distention, abdominal pain, blood in stool, constipation, diarrhea, nausea and vomiting.   Endocrine: Negative for cold intolerance, heat intolerance, polydipsia and polyuria.   Genitourinary: Negative.  Negative for difficulty urinating and frequency.   Musculoskeletal: Negative for arthralgias, back pain, gait problem, joint swelling, myalgias and neck pain.   Skin: Negative for color change, pallor, rash and wound.   Neurological: Negative for dizziness, tremors, weakness, light-headedness, numbness and headaches.   Hematological: Negative for adenopathy.   Psychiatric/Behavioral: Negative for behavioral problems, confusion, self-injury, sleep disturbance and suicidal ideas. The patient is not nervous/anxious.      Objective:   BP  "132/86 (BP Location: Left arm, Patient Position: Sitting, BP Method: Medium (Manual))   Pulse 60   Temp 98.2 °F (36.8 °C) (Tympanic)   Ht 4' 11" (1.499 m)   Wt 64.7 kg (142 lb 10.2 oz)   LMP 09/09/2019 (Within Days)   SpO2 95%   BMI 28.81 kg/m²     Physical Exam   Constitutional: She is oriented to person, place, and time. She appears well-developed and well-nourished.   HENT:   Head: Normocephalic and atraumatic.   Right Ear: External ear normal.   Left Ear: External ear normal.   Nose: Nose normal.   Mouth/Throat: Oropharynx is clear and moist.   Eyes: Pupils are equal, round, and reactive to light. Conjunctivae and EOM are normal.   Neck: Normal range of motion. Neck supple.   Cardiovascular: Normal rate, regular rhythm and normal heart sounds. Exam reveals no gallop and no friction rub.   No murmur heard.  Pulmonary/Chest: Effort normal and breath sounds normal. No respiratory distress. She has no wheezes. She has no rales. She exhibits no tenderness.   Abdominal: Soft. She exhibits no distension. There is no tenderness.   Musculoskeletal: Normal range of motion.   Lymphadenopathy:     She has no cervical adenopathy.   Neurological: She is alert and oriented to person, place, and time.   Skin: Skin is warm and dry.   Psychiatric: She has a normal mood and affect. Her behavior is normal. Judgment and thought content normal.   Vitals reviewed.      Assessment:     1. Essential hypertension      Plan:   Essential hypertension  -     losartan-hydrochlorothiazide 100-12.5 mg (HYZAAR) 100-12.5 mg Tab; Take 1 tablet by mouth once daily.  Dispense: 30 tablet; Refill: 0    -taper labetolol. Discussed in depth with patient today  -monitor BP closely, handout provided  -message me readings on Monday  -signs/symptoms of hypotension discussed in detail    Follow up in about 1 week (around 6/19/2020), or if symptoms worsen or fail to improve.        "

## 2020-06-19 ENCOUNTER — OFFICE VISIT (OUTPATIENT)
Dept: INTERNAL MEDICINE | Facility: CLINIC | Age: 40
End: 2020-06-19
Payer: COMMERCIAL

## 2020-06-19 VITALS
HEART RATE: 67 BPM | DIASTOLIC BLOOD PRESSURE: 74 MMHG | BODY MASS INDEX: 28.72 KG/M2 | HEIGHT: 59 IN | OXYGEN SATURATION: 99 % | WEIGHT: 142.44 LBS | TEMPERATURE: 96 F | SYSTOLIC BLOOD PRESSURE: 120 MMHG

## 2020-06-19 DIAGNOSIS — I10 ESSENTIAL HYPERTENSION: Primary | ICD-10-CM

## 2020-06-19 PROCEDURE — 99213 OFFICE O/P EST LOW 20 MIN: CPT | Mod: S$GLB,,, | Performed by: PHYSICIAN ASSISTANT

## 2020-06-19 PROCEDURE — 99213 PR OFFICE/OUTPT VISIT, EST, LEVL III, 20-29 MIN: ICD-10-PCS | Mod: S$GLB,,, | Performed by: PHYSICIAN ASSISTANT

## 2020-06-19 PROCEDURE — 99999 PR PBB SHADOW E&M-EST. PATIENT-LVL III: CPT | Mod: PBBFAC,,, | Performed by: PHYSICIAN ASSISTANT

## 2020-06-19 PROCEDURE — 99999 PR PBB SHADOW E&M-EST. PATIENT-LVL III: ICD-10-PCS | Mod: PBBFAC,,, | Performed by: PHYSICIAN ASSISTANT

## 2020-06-19 NOTE — PATIENT INSTRUCTIONS
Discharge Instructions: Taking Your Blood Pressure  Blood pressure is the force of blood as it moves from the heart through the blood vessels. You can take your own blood pressure reading using a digital monitor. Take readings as often as your healthcare provider instructs. Take your readings each time in the same way, using the same arm. Here are guidelines for taking your blood pressure.  The American Heart Association (AHA) recommends purchasing a blood pressure monitor that is validated and approved by the Association for the Advancement of Medical Instrumentation, the [place] Hypertension Society, and the International Protocol for the Validation of Automated BP Measuring Devices. If the blood pressure monitor is for a senior adult, a pregnant woman, or a child, make certain it is validated for use with such a population. For the most reliable readings, the AHA recommends an automatic, cuff-style, upper arm (bicep) monitor. The readings from finger and wrist monitors are not as reliable as the upper arm monitor.        Step 1. Relax    · Wait at least a half hour after smoking, eating, or exercising. Do not drink coffee, tea, soda, or other caffeinated beverages before checking your blood pressure.   · Sit comfortably at a table. Place the monitor near you.  · Rest for a few minutes before you begin.        Step 2. Wrap the cuff    · Place your arm on the table, palm up. Put your arm in a position that is level with your heart. Wrap the cuff around your upper arm, about an inch above your elbow. Its best to wrap the cuff on bare skin, not over clothing.  · Make sure your cuff fits. If it doesnt wrap around your upper arm, order a larger cuff. A cuff that is too large or too small can result in an inaccurate blood pressure reading.           Step 3. Inflate the cuff    · Pump the cuff until the scale reads 200. If you have a self-inflating cuff, push the button that starts the pump.  · The cuff will  tighten, then loosen.  · The numbers will change. When they stop changing, your blood pressure reading will appear.  · If you get a reading that is too high or too low for you, relax for a few minutes. Then do the test again.    Step 4. Write down the results  · Write down your blood pressure numbers. Cyrus the date and time. Keep your results in one place, such as a notebook.  · Remove the cuff from your arm. Turn off the machine.  · Take the readings with you to your medical appointments.  · If you start a new blood pressure medicine, or change a blood pressure medicine dose, note the day you started the new drug or dosage on your blood pressure recording sheet. This will help your healthcare provider monitor the effect of medication changes.     Date Last Reviewed: 4/27/2016  © 4024-4241 [employer]. [address] [place] [address]. All rights reserved. This information is not intended as a substitute for professional medical care. Always follow your healthcare professional's instructions.        Taking Your Blood Pressure  Blood pressure is the force of blood against the artery wall as it moves from the heart through the blood vessels. You can take your own blood pressure reading using a digital monitor. Take your readings the same each time, using the same arm. Take readings as often as your healthcare provider instructs.  About blood pressure monitors  Blood pressure monitors are designed for certain ages and cases. You can find monitors for older adults, for pregnant women, and for children. Make sure the one you choose is the right one for your age and situation.  The American Heart Association recommends an automatic cuff monitor that fits on your upper arm (bicep). The cuff should fit your arm size. A cuff thats too large or too small will not give an accurate reading. Measure around your upper arm to find your size.  Monitors that attach to your finger or wrist are not as accurate  as monitors for your upper arm.  Ask your healthcare provider for help in choosing a monitor. Bring your monitor to your next provider visit if you need help in using it the correct way.  The steps below are general instructions for using an automatic digital monitor.  Step 1. Relax    · Take your blood pressure at the same time every day, such as in the morning or evening, or at the time your healthcare provider recommends.  · Wait at least a half-hour after smoking, eating, or exercising. Don't drink coffee, tea, soda, or other caffeinated beverages before checking your blood pressure.  · Sit comfortably at a table with both feet on the floor. Do not cross your legs or feet. Place the monitor near you.  · Rest for a few minutes before you begin.  Step 2. Wrap the cuff    · Place your arm on the table, palm up. Your arm should be at the level of your heart. Wrap the cuff around your upper arm, just above your elbow. Its best done on bare skin, not over clothing. Most cuffs will indicate where the brachial artery (the blood vessel in the middle of the arm at the inner side of the elbow) should line up with the cuff. Look in your monitor's instruction booklet for an illustration. You can also bring your cuff to your healthcare provider and have them show you how to correctly place the cuff.  Step 3. Inflate the cuff    · Push the button that starts the pump.  · The cuff will tighten, then loosen.  · The numbers will change. When they stop changing, your blood pressure reading will appear.  · Take 2 or 3 readings one minute apart.  Step 4. Write down the results of each reading    · Write down your blood pressure numbers for each reading. Note the date and time. Keep your results in one place, such as a notebook. Even if your monitor has a built-in memory, keep a hard copy of the readings.  · Remove the cuff from your arm. Turn off the machine.  · Bring your blood pressure records with your healthcare providers at  each visit.  · If you start a new blood pressure medicine, note the day you started the new medicine. Also note the day if you change the dose of your medicine. This information goes on your blood pressure recording sheet. This will help your healthcare provider monitor how well the medicine changes are working.  · Ask your healthcare provider what numbers should prompt you to call him or her. Also ask what numbers should prompt you to get help right away.  Date Last Reviewed: 11/1/2016  © 5036-2187 [employer]. [address] [place] [address]. All rights reserved. This information is not intended as a substitute for professional medical care. Always follow your healthcare professional's instructions.        Step-by-Step  Checking Your Blood Pressure    Date Last Reviewed: 4/27/2016  © 0229-1153 [employer]. [address] [place] [address]. All rights reserved. This information is not intended as a substitute for professional medical care. Always follow your healthcare professional's instructions.

## 2020-06-19 NOTE — PROGRESS NOTES
Subjective:      Patient ID: Sabrina Ureña is a 40 y.o. female.    Chief Complaint: Follow-up    HPI   Ms. Ureña is here today for a follow up for her HTN. We started her on losartan-hctz and have been tapering the labetalol. Pt has been doing well with the change. Her bp stays mostly in normal range. Denies any symptoms of hypotension or hypertension. She is currently taking labetalol BID and the losartan-hctz once daily.     Patient Active Problem List   Diagnosis    Essential hypertension    Iatrogenic hypothyroidism       Review of Systems   Constitutional: Negative for activity change, appetite change, chills, diaphoresis, fatigue, fever and unexpected weight change.   HENT: Negative.  Negative for congestion, hearing loss, postnasal drip, rhinorrhea, sore throat, trouble swallowing and voice change.    Eyes: Negative.  Negative for visual disturbance.   Respiratory: Negative.  Negative for cough, choking, chest tightness and shortness of breath.    Cardiovascular: Negative for chest pain, palpitations and leg swelling.   Gastrointestinal: Negative for abdominal distention, abdominal pain, blood in stool, constipation, diarrhea, nausea and vomiting.   Endocrine: Negative for cold intolerance, heat intolerance, polydipsia and polyuria.   Genitourinary: Negative.  Negative for difficulty urinating and frequency.   Musculoskeletal: Negative for arthralgias, back pain, gait problem, joint swelling and myalgias.   Skin: Negative for color change, pallor, rash and wound.   Neurological: Negative for dizziness, tremors, weakness, light-headedness, numbness and headaches.   Hematological: Negative for adenopathy.   Psychiatric/Behavioral: Negative for behavioral problems, confusion, self-injury, sleep disturbance and suicidal ideas. The patient is not nervous/anxious.      Objective:   /74 (BP Location: Left arm, Patient Position: Sitting, BP Method: Medium (Manual))   Pulse 67   Temp 96.1 °F (35.6 °C)  "(Tympanic)   Ht 4' 11" (1.499 m)   Wt 64.6 kg (142 lb 6.7 oz)   LMP 09/09/2019 (Within Days)   SpO2 99%   BMI 28.76 kg/m²     Physical Exam  Vitals signs and nursing note reviewed.   Constitutional:       Appearance: She is well-developed.   HENT:      Head: Normocephalic and atraumatic.      Right Ear: External ear normal.      Left Ear: External ear normal.   Neck:      Musculoskeletal: Normal range of motion and neck supple.   Cardiovascular:      Rate and Rhythm: Normal rate and regular rhythm.      Heart sounds: Normal heart sounds. No murmur. No friction rub. No gallop.    Pulmonary:      Effort: Pulmonary effort is normal. No respiratory distress.      Breath sounds: Normal breath sounds. No wheezing or rales.   Chest:      Chest wall: No tenderness.   Musculoskeletal: Normal range of motion.   Lymphadenopathy:      Cervical: No cervical adenopathy.   Skin:     General: Skin is warm and dry.   Neurological:      Mental Status: She is alert and oriented to person, place, and time.   Psychiatric:         Behavior: Behavior normal.         Thought Content: Thought content normal.         Judgment: Judgment normal.         Assessment:     1. Essential hypertension      Plan:   Essential hypertension    -continue to taper labetalol. Take once a day for 4 days, then every other day for 4 days, then stop.  -monitor daily. Message me with any concerns.     Follow up in about 2 weeks (around 7/3/2020), or if symptoms worsen or fail to improve.        "

## 2020-07-06 ENCOUNTER — TELEPHONE (OUTPATIENT)
Dept: OBSTETRICS AND GYNECOLOGY | Facility: CLINIC | Age: 40
End: 2020-07-06

## 2020-07-31 ENCOUNTER — PATIENT MESSAGE (OUTPATIENT)
Dept: OBSTETRICS AND GYNECOLOGY | Facility: CLINIC | Age: 40
End: 2020-07-31

## 2020-07-31 ENCOUNTER — TELEPHONE (OUTPATIENT)
Dept: INTERNAL MEDICINE | Facility: CLINIC | Age: 40
End: 2020-07-31

## 2020-07-31 NOTE — TELEPHONE ENCOUNTER
I contacted the pt and informed that the provider was out of office and would like to assist her with rescheduling however she said her BP has been running good so unless she gets a problem with it she will hold off with an appt right now . I verbalized understanding. //kah

## 2020-09-04 ENCOUNTER — OFFICE VISIT (OUTPATIENT)
Dept: INTERNAL MEDICINE | Facility: CLINIC | Age: 40
End: 2020-09-04
Payer: COMMERCIAL

## 2020-09-04 VITALS
DIASTOLIC BLOOD PRESSURE: 70 MMHG | BODY MASS INDEX: 28 KG/M2 | OXYGEN SATURATION: 99 % | TEMPERATURE: 97 F | SYSTOLIC BLOOD PRESSURE: 118 MMHG | WEIGHT: 138.88 LBS | HEART RATE: 89 BPM | HEIGHT: 59 IN

## 2020-09-04 DIAGNOSIS — I10 ESSENTIAL HYPERTENSION: ICD-10-CM

## 2020-09-04 PROCEDURE — 99213 OFFICE O/P EST LOW 20 MIN: CPT | Mod: S$GLB,,, | Performed by: PHYSICIAN ASSISTANT

## 2020-09-04 PROCEDURE — 99999 PR PBB SHADOW E&M-EST. PATIENT-LVL III: CPT | Mod: PBBFAC,,, | Performed by: PHYSICIAN ASSISTANT

## 2020-09-04 PROCEDURE — 99999 PR PBB SHADOW E&M-EST. PATIENT-LVL III: ICD-10-PCS | Mod: PBBFAC,,, | Performed by: PHYSICIAN ASSISTANT

## 2020-09-04 PROCEDURE — 99213 PR OFFICE/OUTPT VISIT, EST, LEVL III, 20-29 MIN: ICD-10-PCS | Mod: S$GLB,,, | Performed by: PHYSICIAN ASSISTANT

## 2020-09-04 RX ORDER — LOSARTAN POTASSIUM AND HYDROCHLOROTHIAZIDE 12.5; 1 MG/1; MG/1
1 TABLET ORAL DAILY
Qty: 90 TABLET | Refills: 3 | Status: SHIPPED | OUTPATIENT
Start: 2020-09-04 | End: 2021-08-25

## 2020-09-04 NOTE — PROGRESS NOTES
"  Subjective:      Patient ID: Sabrina Ureña is a 40 y.o. female.    Chief Complaint: Blood Pressure Check and Medication Refill    HPI  Patient Active Problem List   Diagnosis    Essential hypertension    Iatrogenic hypothyroidism   Here today for a follow up for her HTN. Doing well on current medications. No headaches, blurred vision, shortness of breath, palpitations. BP has been stable when she does check it at home.     Review of Systems   Constitutional: Negative for activity change, appetite change, chills, diaphoresis, fatigue, fever and unexpected weight change.   HENT: Negative.  Negative for congestion, hearing loss, postnasal drip, rhinorrhea, sore throat, trouble swallowing and voice change.    Eyes: Negative.  Negative for visual disturbance.   Respiratory: Negative.  Negative for cough, choking, chest tightness and shortness of breath.    Cardiovascular: Negative for chest pain, palpitations and leg swelling.   Gastrointestinal: Negative for abdominal distention, abdominal pain, blood in stool, constipation, diarrhea, nausea and vomiting.   Endocrine: Negative for cold intolerance, heat intolerance, polydipsia and polyuria.   Genitourinary: Negative.  Negative for difficulty urinating and frequency.   Musculoskeletal: Negative for arthralgias, back pain, gait problem, joint swelling and myalgias.   Skin: Negative for color change, pallor, rash and wound.   Neurological: Negative for dizziness, tremors, weakness, light-headedness, numbness and headaches.   Hematological: Negative for adenopathy.   Psychiatric/Behavioral: Negative for behavioral problems, confusion, self-injury, sleep disturbance and suicidal ideas. The patient is not nervous/anxious.      Objective:   /70 (BP Location: Right arm, Patient Position: Sitting, BP Method: Large (Manual))   Pulse 89   Temp 96.9 °F (36.1 °C) (Tympanic)   Ht 4' 11" (1.499 m)   Wt 63 kg (138 lb 14.2 oz)   SpO2 99%   BMI 28.05 kg/m² "     Physical Exam  Vitals signs reviewed.   Constitutional:       Appearance: She is well-developed.   HENT:      Head: Normocephalic and atraumatic.      Right Ear: External ear normal.      Left Ear: External ear normal.      Nose: Nose normal.   Eyes:      Conjunctiva/sclera: Conjunctivae normal.      Pupils: Pupils are equal, round, and reactive to light.   Neck:      Musculoskeletal: Normal range of motion and neck supple.   Cardiovascular:      Rate and Rhythm: Normal rate and regular rhythm.      Heart sounds: Normal heart sounds. No murmur. No friction rub. No gallop.    Pulmonary:      Effort: Pulmonary effort is normal. No respiratory distress.      Breath sounds: Normal breath sounds. No wheezing or rales.   Chest:      Chest wall: No tenderness.   Abdominal:      General: There is no distension.      Palpations: Abdomen is soft.      Tenderness: There is no abdominal tenderness.   Musculoskeletal: Normal range of motion.   Lymphadenopathy:      Cervical: No cervical adenopathy.   Skin:     General: Skin is warm and dry.   Neurological:      Mental Status: She is alert and oriented to person, place, and time.   Psychiatric:         Behavior: Behavior normal.         Thought Content: Thought content normal.         Judgment: Judgment normal.         Assessment:     1. Essential hypertension      Plan:   Essential hypertension  -     losartan-hydrochlorothiazide 100-12.5 mg (HYZAAR) 100-12.5 mg Tab; Take 1 tablet by mouth once daily.  Dispense: 90 tablet; Refill: 3      Follow up if symptoms worsen or fail to improve.

## 2020-10-06 ENCOUNTER — PATIENT MESSAGE (OUTPATIENT)
Dept: ADMINISTRATIVE | Facility: HOSPITAL | Age: 40
End: 2020-10-06

## 2020-10-26 ENCOUNTER — PATIENT MESSAGE (OUTPATIENT)
Dept: OBSTETRICS AND GYNECOLOGY | Facility: CLINIC | Age: 40
End: 2020-10-26

## 2020-10-26 RX ORDER — ACETAMINOPHEN AND CODEINE PHOSPHATE 120; 12 MG/5ML; MG/5ML
1 SOLUTION ORAL DAILY
Qty: 90 TABLET | Refills: 2 | Status: SHIPPED | OUTPATIENT
Start: 2020-10-26 | End: 2021-12-15 | Stop reason: SDUPTHER

## 2020-11-20 ENCOUNTER — TELEPHONE (OUTPATIENT)
Dept: INTERNAL MEDICINE | Facility: CLINIC | Age: 40
End: 2020-11-20

## 2020-12-27 ENCOUNTER — PATIENT MESSAGE (OUTPATIENT)
Dept: INTERNAL MEDICINE | Facility: CLINIC | Age: 40
End: 2020-12-27

## 2020-12-28 RX ORDER — LEVOTHYROXINE SODIUM 125 UG/1
125 TABLET ORAL DAILY
Qty: 30 TABLET | Refills: 11 | Status: SHIPPED | OUTPATIENT
Start: 2020-12-28 | End: 2021-06-18

## 2021-06-08 ENCOUNTER — OFFICE VISIT (OUTPATIENT)
Dept: INTERNAL MEDICINE | Facility: CLINIC | Age: 41
End: 2021-06-08
Payer: COMMERCIAL

## 2021-06-08 VITALS
TEMPERATURE: 97 F | WEIGHT: 147.94 LBS | DIASTOLIC BLOOD PRESSURE: 82 MMHG | HEART RATE: 81 BPM | HEIGHT: 59 IN | BODY MASS INDEX: 29.82 KG/M2 | OXYGEN SATURATION: 99 % | SYSTOLIC BLOOD PRESSURE: 128 MMHG

## 2021-06-08 DIAGNOSIS — Z12.31 ENCOUNTER FOR SCREENING MAMMOGRAM FOR MALIGNANT NEOPLASM OF BREAST: ICD-10-CM

## 2021-06-08 DIAGNOSIS — Z11.59 NEED FOR HEPATITIS C SCREENING TEST: ICD-10-CM

## 2021-06-08 DIAGNOSIS — Z00.00 ROUTINE HEALTH MAINTENANCE: Primary | ICD-10-CM

## 2021-06-08 DIAGNOSIS — E03.2 IATROGENIC HYPOTHYROIDISM: ICD-10-CM

## 2021-06-08 DIAGNOSIS — I10 ESSENTIAL HYPERTENSION: ICD-10-CM

## 2021-06-08 PROCEDURE — 99999 PR PBB SHADOW E&M-EST. PATIENT-LVL III: CPT | Mod: PBBFAC,,, | Performed by: FAMILY MEDICINE

## 2021-06-08 PROCEDURE — 99396 PREV VISIT EST AGE 40-64: CPT | Mod: S$GLB,,, | Performed by: FAMILY MEDICINE

## 2021-06-08 PROCEDURE — 99396 PR PREVENTIVE VISIT,EST,40-64: ICD-10-PCS | Mod: S$GLB,,, | Performed by: FAMILY MEDICINE

## 2021-06-08 PROCEDURE — 99999 PR PBB SHADOW E&M-EST. PATIENT-LVL III: ICD-10-PCS | Mod: PBBFAC,,, | Performed by: FAMILY MEDICINE

## 2021-06-12 ENCOUNTER — LAB VISIT (OUTPATIENT)
Dept: LAB | Facility: HOSPITAL | Age: 41
End: 2021-06-12
Attending: FAMILY MEDICINE
Payer: COMMERCIAL

## 2021-06-12 DIAGNOSIS — E03.2 IATROGENIC HYPOTHYROIDISM: ICD-10-CM

## 2021-06-12 DIAGNOSIS — Z11.59 NEED FOR HEPATITIS C SCREENING TEST: ICD-10-CM

## 2021-06-12 DIAGNOSIS — I10 ESSENTIAL HYPERTENSION: ICD-10-CM

## 2021-06-12 LAB
ALBUMIN SERPL BCP-MCNC: 3.8 G/DL (ref 3.5–5.2)
ALP SERPL-CCNC: 68 U/L (ref 55–135)
ALT SERPL W/O P-5'-P-CCNC: 15 U/L (ref 10–44)
ANION GAP SERPL CALC-SCNC: 12 MMOL/L (ref 8–16)
AST SERPL-CCNC: 18 U/L (ref 10–40)
BILIRUB SERPL-MCNC: 0.5 MG/DL (ref 0.1–1)
BUN SERPL-MCNC: 14 MG/DL (ref 6–20)
CALCIUM SERPL-MCNC: 9.6 MG/DL (ref 8.7–10.5)
CHLORIDE SERPL-SCNC: 106 MMOL/L (ref 95–110)
CHOLEST SERPL-MCNC: 198 MG/DL (ref 120–199)
CHOLEST/HDLC SERPL: 3.5 {RATIO} (ref 2–5)
CO2 SERPL-SCNC: 23 MMOL/L (ref 23–29)
CREAT SERPL-MCNC: 0.7 MG/DL (ref 0.5–1.4)
EST. GFR  (AFRICAN AMERICAN): >60 ML/MIN/1.73 M^2
EST. GFR  (NON AFRICAN AMERICAN): >60 ML/MIN/1.73 M^2
GLUCOSE SERPL-MCNC: 83 MG/DL (ref 70–110)
HDLC SERPL-MCNC: 56 MG/DL (ref 40–75)
HDLC SERPL: 28.3 % (ref 20–50)
LDLC SERPL CALC-MCNC: 124.4 MG/DL (ref 63–159)
NONHDLC SERPL-MCNC: 142 MG/DL
POTASSIUM SERPL-SCNC: 4 MMOL/L (ref 3.5–5.1)
PROT SERPL-MCNC: 8 G/DL (ref 6–8.4)
SODIUM SERPL-SCNC: 141 MMOL/L (ref 136–145)
T4 FREE SERPL-MCNC: 1.23 NG/DL (ref 0.71–1.51)
TRIGL SERPL-MCNC: 88 MG/DL (ref 30–150)
TSH SERPL DL<=0.005 MIU/L-ACNC: 0.01 UIU/ML (ref 0.4–4)

## 2021-06-12 PROCEDURE — 84439 ASSAY OF FREE THYROXINE: CPT | Performed by: FAMILY MEDICINE

## 2021-06-12 PROCEDURE — 36415 COLL VENOUS BLD VENIPUNCTURE: CPT | Performed by: FAMILY MEDICINE

## 2021-06-12 PROCEDURE — 84443 ASSAY THYROID STIM HORMONE: CPT | Performed by: FAMILY MEDICINE

## 2021-06-12 PROCEDURE — 86803 HEPATITIS C AB TEST: CPT | Performed by: FAMILY MEDICINE

## 2021-06-12 PROCEDURE — 80061 LIPID PANEL: CPT | Performed by: FAMILY MEDICINE

## 2021-06-12 PROCEDURE — 80053 COMPREHEN METABOLIC PANEL: CPT | Performed by: FAMILY MEDICINE

## 2021-06-14 LAB — HCV AB SERPL QL IA: NEGATIVE

## 2021-06-18 DIAGNOSIS — E03.2 IATROGENIC HYPOTHYROIDISM: Primary | ICD-10-CM

## 2021-06-18 RX ORDER — LEVOTHYROXINE SODIUM 100 UG/1
100 TABLET ORAL DAILY
Qty: 30 TABLET | Refills: 11 | Status: SHIPPED | OUTPATIENT
Start: 2021-06-18 | End: 2022-05-18

## 2021-07-30 ENCOUNTER — LAB VISIT (OUTPATIENT)
Dept: LAB | Facility: HOSPITAL | Age: 41
End: 2021-07-30
Attending: FAMILY MEDICINE
Payer: COMMERCIAL

## 2021-07-30 DIAGNOSIS — E03.2 IATROGENIC HYPOTHYROIDISM: ICD-10-CM

## 2021-07-30 PROCEDURE — 36415 COLL VENOUS BLD VENIPUNCTURE: CPT | Performed by: FAMILY MEDICINE

## 2021-07-30 PROCEDURE — 84443 ASSAY THYROID STIM HORMONE: CPT | Performed by: FAMILY MEDICINE

## 2021-07-31 LAB — TSH SERPL DL<=0.005 MIU/L-ACNC: 0.48 UIU/ML (ref 0.4–4)

## 2021-10-04 ENCOUNTER — HOSPITAL ENCOUNTER (OUTPATIENT)
Dept: RADIOLOGY | Facility: HOSPITAL | Age: 41
Discharge: HOME | End: 2021-10-04
Attending: FAMILY MEDICINE
Payer: COMMERCIAL

## 2021-10-04 DIAGNOSIS — Z12.31 ENCOUNTER FOR SCREENING MAMMOGRAM FOR MALIGNANT NEOPLASM OF BREAST: ICD-10-CM

## 2021-10-04 PROCEDURE — 77067 MAMMO DIGITAL SCREENING BILAT WITH TOMO: ICD-10-PCS | Mod: 26,,, | Performed by: RADIOLOGY

## 2021-10-04 PROCEDURE — 77063 BREAST TOMOSYNTHESIS BI: CPT | Mod: 26,,, | Performed by: RADIOLOGY

## 2021-10-04 PROCEDURE — 77063 MAMMO DIGITAL SCREENING BILAT WITH TOMO: ICD-10-PCS | Mod: 26,,, | Performed by: RADIOLOGY

## 2021-10-04 PROCEDURE — 77067 SCR MAMMO BI INCL CAD: CPT | Mod: TC

## 2021-10-04 PROCEDURE — 77067 SCR MAMMO BI INCL CAD: CPT | Mod: 26,,, | Performed by: RADIOLOGY

## 2021-10-06 ENCOUNTER — TELEPHONE (OUTPATIENT)
Dept: RADIOLOGY | Facility: HOSPITAL | Age: 41
End: 2021-10-06

## 2021-10-07 ENCOUNTER — HOSPITAL ENCOUNTER (OUTPATIENT)
Dept: RADIOLOGY | Facility: HOSPITAL | Age: 41
Discharge: HOME | End: 2021-10-07
Attending: FAMILY MEDICINE
Payer: COMMERCIAL

## 2021-10-07 ENCOUNTER — IMMUNIZATION (OUTPATIENT)
Dept: INTERNAL MEDICINE | Facility: CLINIC | Age: 41
End: 2021-10-07
Payer: COMMERCIAL

## 2021-10-07 DIAGNOSIS — R92.8 ABNORMAL MAMMOGRAM: ICD-10-CM

## 2021-10-07 PROCEDURE — 90471 FLU VACCINE (QUAD) GREATER THAN OR EQUAL TO 3YO PRESERVATIVE FREE IM: ICD-10-PCS | Mod: S$GLB,,, | Performed by: FAMILY MEDICINE

## 2021-10-07 PROCEDURE — 77061 BREAST TOMOSYNTHESIS UNI: CPT | Mod: 26,RT,, | Performed by: RADIOLOGY

## 2021-10-07 PROCEDURE — 77065 DX MAMMO INCL CAD UNI: CPT | Mod: 26,RT,, | Performed by: RADIOLOGY

## 2021-10-07 PROCEDURE — 90686 FLU VACCINE (QUAD) GREATER THAN OR EQUAL TO 3YO PRESERVATIVE FREE IM: ICD-10-PCS | Mod: S$GLB,,, | Performed by: FAMILY MEDICINE

## 2021-10-07 PROCEDURE — 90686 IIV4 VACC NO PRSV 0.5 ML IM: CPT | Mod: S$GLB,,, | Performed by: FAMILY MEDICINE

## 2021-10-07 PROCEDURE — 90471 IMMUNIZATION ADMIN: CPT | Mod: S$GLB,,, | Performed by: FAMILY MEDICINE

## 2021-10-07 PROCEDURE — 77065 MAMMO DIGITAL DIAGNOSTIC RIGHT WITH TOMO: ICD-10-PCS | Mod: 26,RT,, | Performed by: RADIOLOGY

## 2021-10-07 PROCEDURE — 77061 MAMMO DIGITAL DIAGNOSTIC RIGHT WITH TOMO: ICD-10-PCS | Mod: 26,RT,, | Performed by: RADIOLOGY

## 2021-10-07 PROCEDURE — 77061 BREAST TOMOSYNTHESIS UNI: CPT | Mod: TC,RT

## 2021-12-15 ENCOUNTER — PATIENT MESSAGE (OUTPATIENT)
Dept: INTERNAL MEDICINE | Facility: CLINIC | Age: 41
End: 2021-12-15
Payer: COMMERCIAL

## 2021-12-15 RX ORDER — ACYCLOVIR 400 MG/1
400 TABLET ORAL 2 TIMES DAILY
Qty: 10 TABLET | Refills: 11 | Status: SHIPPED | OUTPATIENT
Start: 2021-12-15 | End: 2024-01-16 | Stop reason: SDUPTHER

## 2021-12-16 RX ORDER — ACETAMINOPHEN AND CODEINE PHOSPHATE 120; 12 MG/5ML; MG/5ML
1 SOLUTION ORAL DAILY
Qty: 30 TABLET | Refills: 0 | Status: SHIPPED | OUTPATIENT
Start: 2021-12-16 | End: 2022-01-10

## 2022-04-14 ENCOUNTER — TELEPHONE (OUTPATIENT)
Dept: INTERNAL MEDICINE | Facility: CLINIC | Age: 42
End: 2022-04-14
Payer: COMMERCIAL

## 2022-04-27 ENCOUNTER — PATIENT MESSAGE (OUTPATIENT)
Dept: ADMINISTRATIVE | Facility: HOSPITAL | Age: 42
End: 2022-04-27
Payer: COMMERCIAL

## 2022-06-06 ENCOUNTER — OFFICE VISIT (OUTPATIENT)
Dept: INTERNAL MEDICINE | Facility: CLINIC | Age: 42
End: 2022-06-06
Payer: COMMERCIAL

## 2022-06-06 VITALS
BODY MASS INDEX: 28.54 KG/M2 | HEIGHT: 59 IN | OXYGEN SATURATION: 98 % | WEIGHT: 141.56 LBS | SYSTOLIC BLOOD PRESSURE: 136 MMHG | HEART RATE: 88 BPM | DIASTOLIC BLOOD PRESSURE: 84 MMHG | TEMPERATURE: 97 F

## 2022-06-06 DIAGNOSIS — I10 ESSENTIAL HYPERTENSION: ICD-10-CM

## 2022-06-06 DIAGNOSIS — Z00.00 ROUTINE HEALTH MAINTENANCE: Primary | ICD-10-CM

## 2022-06-06 DIAGNOSIS — E03.2 IATROGENIC HYPOTHYROIDISM: ICD-10-CM

## 2022-06-06 PROCEDURE — 80061 LIPID PANEL: CPT | Performed by: FAMILY MEDICINE

## 2022-06-06 PROCEDURE — 99999 PR PBB SHADOW E&M-EST. PATIENT-LVL III: CPT | Mod: PBBFAC,,, | Performed by: FAMILY MEDICINE

## 2022-06-06 PROCEDURE — 84443 ASSAY THYROID STIM HORMONE: CPT | Performed by: FAMILY MEDICINE

## 2022-06-06 PROCEDURE — 99396 PR PREVENTIVE VISIT,EST,40-64: ICD-10-PCS | Mod: S$GLB,,, | Performed by: FAMILY MEDICINE

## 2022-06-06 PROCEDURE — 84439 ASSAY OF FREE THYROXINE: CPT | Performed by: FAMILY MEDICINE

## 2022-06-06 PROCEDURE — 99999 PR PBB SHADOW E&M-EST. PATIENT-LVL III: ICD-10-PCS | Mod: PBBFAC,,, | Performed by: FAMILY MEDICINE

## 2022-06-06 PROCEDURE — 99396 PREV VISIT EST AGE 40-64: CPT | Mod: S$GLB,,, | Performed by: FAMILY MEDICINE

## 2022-06-06 PROCEDURE — 80053 COMPREHEN METABOLIC PANEL: CPT | Performed by: FAMILY MEDICINE

## 2022-06-06 NOTE — PROGRESS NOTES
Subjective:       Patient ID: Sabrina Ureña is a . female.    Chief Complaint: here for physical examination and issues  below    HPI Hypertension: blood pressures at home normal. Tolerating medicine.   Hypothyroidism: due tsh. Tolerating med. 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 No weakness. occas r arm discomfort. Lots of moving boxes etc at work  Past Medical History:   Diagnosis Date    Abnormal  test 2020    Abnormal glucose in pregnancy, antepartum 3/9/2020    Elderly multigravida, currently pregnant 1/3/2020    Weekly BPP after 30 weeks    First degree perineal laceration during delivery 2020    Hypertension     Hypertension affecting pregnancy in third trimester 2020    Hyperthyroidism     s/p radioact thy ablation    Iatrogenic hypothyroidism      Past Surgical History:   Procedure Laterality Date    breast augmentation      radioactive ablation thyroid           Family History   Problem Relation Age of Onset    Hyperlipidemia Mother     Hypertension Mother     Gout Father      Social History     Socioeconomic History    Marital status:      Spouse name: Not on file    Number of children: 1    Years of education: Not on file    Highest education level: Not on file   Occupational History    Not on file   Tobacco Use    Smoking status: Never Smoker    Smokeless tobacco: Never Used   Substance and Sexual Activity    Alcohol use: No    Drug use: No    Sexual activity: Yes     Partners: Male     Birth control/protection: None   Other Topics Concern    Not on file   Social History Narrative     marriage 2015    2nd child 13 months old as of     Other child 21 will be     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     Social Determinants of Health     Financial Resource Strain:     Difficulty of Paying Living Expenses:    Food Insecurity:      Worried About Running Out of Food in the Last Year:     Ran Out of Food in the Last Year:    Transportation Needs:     Lack of Transportation (Medical):     Lack of Transportation (Non-Medical):    Physical Activity:     Days of Exercise per Week:     Minutes of Exercise per Session:    Stress:     Feeling of Stress :    Social Connections:     Frequency of Communication with Friends and Family:     Frequency of Social Gatherings with Friends and Family:     Attends Catholic Services:     Active Member of Clubs or Organizations:     Attends Club or Organization Meetings:     Marital Status:          Review of Systems    Cardiovascular: no chest pain  Chest: no shortness of breath  Abd: no abd pain  Remainder review of systems negative    Objective:      Physical Exam  Vitals and nursing note reviewed.   Constitutional:       General: She is not in acute distress.     Appearance: She is well-developed.   HENT:      Head: Atraumatic.      Right Ear: External ear normal.      Left Ear: External ear normal.      Nose: Nose normal.      Mouth/Throat:      Pharynx: No oropharyngeal exudate.   Eyes:      General: No scleral icterus.     Conjunctiva/sclera: Conjunctivae normal.      Pupils: Pupils are equal, round, and reactive to light.   Neck:      Thyroid: No thyromegaly.   Cardiovascular:      Rate and Rhythm: Normal rate and regular rhythm.      Heart sounds: Normal heart sounds. No murmur heard.     Pulmonary:      Effort: Pulmonary effort is normal. No respiratory distress.      Breath sounds: Normal breath sounds. No wheezing or rales.   Abdominal:      General: Bowel sounds are normal. There is no distension.      Palpations: Abdomen is soft. There is no mass.      Tenderness: There is no abdominal tenderness. There is no guarding or rebound.   Musculoskeletal:         General: No tenderness. Normal range of motion.      Cervical back: Normal range of motion and neck supple.   Lymphadenopathy:       Cervical: No cervical adenopathy.   Skin:     General: Skin is warm.      Coloration: Skin is not pale.      Findings: No erythema or rash.   Neurological:      Mental Status: She is alert and oriented to person, place, and time.      Cranial Nerves: No cranial nerve deficit.      Motor: No abnormal muscle tone.      Coordination: Coordination normal.   Psychiatric:         Behavior: Behavior normal.         Thought Content: Thought content normal.         Judgment: Judgment normal.     Hands no atropy. Nl motor      Assessment:       1. Routine health maintenance    2. Essential hypertension    3. Iatrogenic hypothyroidism              cts  Plan:     She has wrist splints and will try qhs for couple weeks. If no help then notify plan ortho. If does resolve then use nightly  *Lab today if able  Lab 12 months and follow up after    Routine health maintenance    Iatrogenic hypothyroidism  -     TSH  -     TSH; Future; Expected date: 06/06/2023    Essential hypertension  -     Comprehensive Metabolic Panel  -     Lipid Panel  -     Comprehensive Metabolic Panel; Future; Expected date: 06/06/2023  -     Lipid Panel; Future; Expected date: 06/06/2023

## 2022-06-07 LAB
ALBUMIN SERPL BCP-MCNC: 4.3 G/DL (ref 3.5–5.2)
ALP SERPL-CCNC: 61 U/L (ref 55–135)
ALT SERPL W/O P-5'-P-CCNC: 39 U/L (ref 10–44)
ANION GAP SERPL CALC-SCNC: 13 MMOL/L (ref 8–16)
AST SERPL-CCNC: 23 U/L (ref 10–40)
BILIRUB SERPL-MCNC: 0.3 MG/DL (ref 0.1–1)
BUN SERPL-MCNC: 15 MG/DL (ref 6–20)
CALCIUM SERPL-MCNC: 9.5 MG/DL (ref 8.7–10.5)
CHLORIDE SERPL-SCNC: 102 MMOL/L (ref 95–110)
CHOLEST SERPL-MCNC: 200 MG/DL (ref 120–199)
CHOLEST/HDLC SERPL: 3.8 {RATIO} (ref 2–5)
CO2 SERPL-SCNC: 20 MMOL/L (ref 23–29)
CREAT SERPL-MCNC: 0.7 MG/DL (ref 0.5–1.4)
EST. GFR  (AFRICAN AMERICAN): >60 ML/MIN/1.73 M^2
EST. GFR  (NON AFRICAN AMERICAN): >60 ML/MIN/1.73 M^2
GLUCOSE SERPL-MCNC: 78 MG/DL (ref 70–110)
HDLC SERPL-MCNC: 52 MG/DL (ref 40–75)
HDLC SERPL: 26 % (ref 20–50)
LDLC SERPL CALC-MCNC: 92 MG/DL (ref 63–159)
NONHDLC SERPL-MCNC: 148 MG/DL
POTASSIUM SERPL-SCNC: 3.6 MMOL/L (ref 3.5–5.1)
PROT SERPL-MCNC: 8.8 G/DL (ref 6–8.4)
SODIUM SERPL-SCNC: 135 MMOL/L (ref 136–145)
T4 FREE SERPL-MCNC: 1.31 NG/DL (ref 0.71–1.51)
TRIGL SERPL-MCNC: 280 MG/DL (ref 30–150)
TSH SERPL DL<=0.005 MIU/L-ACNC: 0.07 UIU/ML (ref 0.4–4)

## 2022-06-13 ENCOUNTER — PATIENT MESSAGE (OUTPATIENT)
Dept: INTERNAL MEDICINE | Facility: CLINIC | Age: 42
End: 2022-06-13
Payer: COMMERCIAL

## 2022-06-13 DIAGNOSIS — E03.2 IATROGENIC HYPOTHYROIDISM: Primary | ICD-10-CM

## 2022-06-30 ENCOUNTER — TELEPHONE (OUTPATIENT)
Dept: INTERNAL MEDICINE | Facility: CLINIC | Age: 42
End: 2022-06-30
Payer: COMMERCIAL

## 2022-06-30 RX ORDER — LEVOTHYROXINE SODIUM 88 UG/1
88 TABLET ORAL
Qty: 30 TABLET | Refills: 11 | Status: SHIPPED | OUTPATIENT
Start: 2022-06-30 | End: 2022-10-09

## 2022-07-05 ENCOUNTER — LAB VISIT (OUTPATIENT)
Dept: LAB | Facility: HOSPITAL | Age: 42
End: 2022-07-05
Attending: OBSTETRICS & GYNECOLOGY
Payer: COMMERCIAL

## 2022-07-05 ENCOUNTER — OFFICE VISIT (OUTPATIENT)
Dept: OBSTETRICS AND GYNECOLOGY | Facility: CLINIC | Age: 42
End: 2022-07-05
Payer: COMMERCIAL

## 2022-07-05 VITALS
WEIGHT: 143.5 LBS | DIASTOLIC BLOOD PRESSURE: 80 MMHG | SYSTOLIC BLOOD PRESSURE: 140 MMHG | HEIGHT: 59 IN | BODY MASS INDEX: 28.93 KG/M2

## 2022-07-05 DIAGNOSIS — O20.9 VAGINAL BLEEDING AFFECTING EARLY PREGNANCY: Primary | ICD-10-CM

## 2022-07-05 DIAGNOSIS — O20.9 VAGINAL BLEEDING AFFECTING EARLY PREGNANCY: ICD-10-CM

## 2022-07-05 LAB
B-HCG UR QL: POSITIVE
CTP QC/QA: YES
HCG INTACT+B SERPL-ACNC: 459 MIU/ML

## 2022-07-05 PROCEDURE — 86900 BLOOD TYPING SEROLOGIC ABO: CPT | Performed by: OBSTETRICS & GYNECOLOGY

## 2022-07-05 PROCEDURE — 85025 COMPLETE CBC W/AUTO DIFF WBC: CPT | Performed by: OBSTETRICS & GYNECOLOGY

## 2022-07-05 PROCEDURE — 86901 BLOOD TYPING SEROLOGIC RH(D): CPT | Performed by: OBSTETRICS & GYNECOLOGY

## 2022-07-05 PROCEDURE — 81025 URINE PREGNANCY TEST: CPT | Mod: S$GLB,,, | Performed by: OBSTETRICS & GYNECOLOGY

## 2022-07-05 PROCEDURE — 99213 OFFICE O/P EST LOW 20 MIN: CPT | Mod: S$GLB,,, | Performed by: OBSTETRICS & GYNECOLOGY

## 2022-07-05 PROCEDURE — 99999 PR PBB SHADOW E&M-EST. PATIENT-LVL III: ICD-10-PCS | Mod: PBBFAC,,, | Performed by: OBSTETRICS & GYNECOLOGY

## 2022-07-05 PROCEDURE — 81025 PR  URINE PREGNANCY TEST: ICD-10-PCS | Mod: S$GLB,,, | Performed by: OBSTETRICS & GYNECOLOGY

## 2022-07-05 PROCEDURE — 84702 CHORIONIC GONADOTROPIN TEST: CPT | Performed by: OBSTETRICS & GYNECOLOGY

## 2022-07-05 PROCEDURE — 99999 PR PBB SHADOW E&M-EST. PATIENT-LVL III: CPT | Mod: PBBFAC,,, | Performed by: OBSTETRICS & GYNECOLOGY

## 2022-07-05 PROCEDURE — 36415 COLL VENOUS BLD VENIPUNCTURE: CPT | Performed by: OBSTETRICS & GYNECOLOGY

## 2022-07-05 PROCEDURE — 99213 PR OFFICE/OUTPT VISIT, EST, LEVL III, 20-29 MIN: ICD-10-PCS | Mod: S$GLB,,, | Performed by: OBSTETRICS & GYNECOLOGY

## 2022-07-05 NOTE — PROGRESS NOTES
Subjective:       Patient ID: Sabrina Ureña is a 42 y.o. female.    Chief Complaint:  Possible Pregnancy      History of Present Illness  HPI  Pt reports complaints of vaginal bleeding and cramping two days ago.  Pt noted passage of large clots and tissue.  This stopped within hours, but still notes light flow of bleeding.  Pt also had cramping that has since resolved completely.  Pt notes a positive home UPT and is concerned that she may have had a miscarriage.  Pt was not trying for pregnancy and felt conflicted about pregnancy.  Denies trauma, fever.    GYN & OB History  Patient's last menstrual period was 2022.   Date of Last Pap: No result found    OB History    Para Term  AB Living   2 2 1     2   SAB IAB Ectopic Multiple Live Births         0 2      # Outcome Date GA Lbr Diego/2nd Weight Sex Delivery Anes PTL Lv   2 Term 20 38w2d / 00:45 2.855 kg (6 lb 4.7 oz) M Vag-Spont EPI N RAQUEL   1 Para 01   2.268 kg (5 lb) M Vag-Spont None  RAQUEL       Review of Systems  Review of Systems   Constitutional: Negative for activity change, appetite change, chills, fatigue, fever and unexpected weight change.   Respiratory: Negative for shortness of breath.    Cardiovascular: Negative for chest pain, palpitations and leg swelling.   Gastrointestinal: Negative for abdominal pain, bloating, blood in stool, constipation, diarrhea, nausea and vomiting.   Genitourinary: Positive for vaginal bleeding. Negative for dyspareunia, dysuria, flank pain, frequency, genital sores, hematuria, pelvic pain, urgency, vaginal discharge, vaginal pain, urinary incontinence, vaginal dryness and vaginal odor.   Musculoskeletal: Negative for back pain.   Neurological: Negative for syncope and headaches.           Objective:    Physical Exam:   Constitutional: She is oriented to person, place, and time. She appears well-developed and well-nourished. No distress.       Cardiovascular: Normal rate and regular rhythm.      Pulmonary/Chest: Effort normal and breath sounds normal.        Abdominal: Soft. Bowel sounds are normal. She exhibits no distension. There is no abdominal tenderness.     Genitourinary:    Right adnexa and left adnexa normal.      Pelvic exam was performed with patient supine.   There is no rash, tenderness, lesion or injury on the right labia. There is no rash, tenderness, lesion or injury on the left labia. Cervix is normal. Right adnexum displays no mass, no tenderness and no fullness. Left adnexum displays no mass, no tenderness and no fullness. There is bleeding (moderate amount of old blood and clot in upper vault; scant active bleeding) in the vagina. No erythema,  no vaginal discharge or tenderness in the vagina.    No foreign body in the vagina.      No signs of injury in the vagina.   Cervix exhibits no motion tenderness, no discharge and no friability. Uterus is enlarged. Uterus is not deviated and not tender. Uterus size: 8 cm.   Genitourinary Comments: Cervix closed  UPT today Positive             Musculoskeletal: Normal range of motion and moves all extremeties. No tenderness or edema.       Neurological: She is alert and oriented to person, place, and time.    Skin: Skin is warm and dry.    Psychiatric: She has a normal mood and affect. Her behavior is normal. Thought content normal.          Assessment:        1. Vaginal bleeding affecting early pregnancy             Plan:      Vaginal bleeding affecting early pregnancy  -     POCT urine pregnancy  -     HCG, Quantitative; Future; Expected date: 07/05/2022  -     CBC Auto Differential; Future; Expected date: 07/05/2022  -     Type & Screen; Future  -     HCG, Quantitative; Future; Expected date: 07/05/2022  -     US OB/GYN Procedure ([facility]); Future  -     Clinical presentation and exam findings are suggestive of SAB or theatened Ab.  Will obtain lab evaluation (ultrasound is already done and not available).  Await results and repeat HCG in 48  hrs.  Pt counseled on SAB precautions and warning signs were discussed.    Follow up in about 1 week (around 7/12/2022).

## 2022-07-06 LAB
ABO + RH BLD: NORMAL
BASOPHILS # BLD AUTO: 0.06 K/UL (ref 0–0.2)
BASOPHILS NFR BLD: 0.6 % (ref 0–1.9)
BLD GP AB SCN CELLS X3 SERPL QL: NORMAL
DIFFERENTIAL METHOD: NORMAL
EOSINOPHIL # BLD AUTO: 0.4 K/UL (ref 0–0.5)
EOSINOPHIL NFR BLD: 3.6 % (ref 0–8)
ERYTHROCYTE [DISTWIDTH] IN BLOOD BY AUTOMATED COUNT: 13.7 % (ref 11.5–14.5)
HCT VFR BLD AUTO: 39.9 % (ref 37–48.5)
HGB BLD-MCNC: 12.9 G/DL (ref 12–16)
IMM GRANULOCYTES # BLD AUTO: 0.03 K/UL (ref 0–0.04)
IMM GRANULOCYTES NFR BLD AUTO: 0.3 % (ref 0–0.5)
LYMPHOCYTES # BLD AUTO: 2.5 K/UL (ref 1–4.8)
LYMPHOCYTES NFR BLD: 23.6 % (ref 18–48)
MCH RBC QN AUTO: 29.9 PG (ref 27–31)
MCHC RBC AUTO-ENTMCNC: 32.3 G/DL (ref 32–36)
MCV RBC AUTO: 93 FL (ref 82–98)
MONOCYTES # BLD AUTO: 0.8 K/UL (ref 0.3–1)
MONOCYTES NFR BLD: 7.5 % (ref 4–15)
NEUTROPHILS # BLD AUTO: 6.9 K/UL (ref 1.8–7.7)
NEUTROPHILS NFR BLD: 64.4 % (ref 38–73)
NRBC BLD-RTO: 0 /100 WBC
PLATELET # BLD AUTO: 286 K/UL (ref 150–450)
PMV BLD AUTO: 10.5 FL (ref 9.2–12.9)
RBC # BLD AUTO: 4.31 M/UL (ref 4–5.4)
WBC # BLD AUTO: 10.77 K/UL (ref 3.9–12.7)

## 2022-07-07 ENCOUNTER — LAB VISIT (OUTPATIENT)
Dept: LAB | Facility: HOSPITAL | Age: 42
End: 2022-07-07
Attending: OBSTETRICS & GYNECOLOGY
Payer: COMMERCIAL

## 2022-07-07 DIAGNOSIS — O20.9 VAGINAL BLEEDING AFFECTING EARLY PREGNANCY: ICD-10-CM

## 2022-07-07 LAB — HCG INTACT+B SERPL-ACNC: 155 MIU/ML

## 2022-07-07 PROCEDURE — 36415 COLL VENOUS BLD VENIPUNCTURE: CPT | Performed by: OBSTETRICS & GYNECOLOGY

## 2022-07-07 PROCEDURE — 84702 CHORIONIC GONADOTROPIN TEST: CPT | Performed by: OBSTETRICS & GYNECOLOGY

## 2022-07-12 ENCOUNTER — LAB VISIT (OUTPATIENT)
Dept: LAB | Facility: HOSPITAL | Age: 42
End: 2022-07-12
Attending: OBSTETRICS & GYNECOLOGY
Payer: COMMERCIAL

## 2022-07-12 ENCOUNTER — OFFICE VISIT (OUTPATIENT)
Dept: OBSTETRICS AND GYNECOLOGY | Facility: CLINIC | Age: 42
End: 2022-07-12
Payer: COMMERCIAL

## 2022-07-12 ENCOUNTER — PROCEDURE VISIT (OUTPATIENT)
Dept: OBSTETRICS AND GYNECOLOGY | Facility: CLINIC | Age: 42
End: 2022-07-12
Payer: COMMERCIAL

## 2022-07-12 VITALS
SYSTOLIC BLOOD PRESSURE: 104 MMHG | HEIGHT: 59 IN | DIASTOLIC BLOOD PRESSURE: 74 MMHG | WEIGHT: 141.75 LBS | BODY MASS INDEX: 28.58 KG/M2

## 2022-07-12 DIAGNOSIS — O03.9 SAB (SPONTANEOUS ABORTION): ICD-10-CM

## 2022-07-12 DIAGNOSIS — O20.9 VAGINAL BLEEDING AFFECTING EARLY PREGNANCY: ICD-10-CM

## 2022-07-12 DIAGNOSIS — Z30.41 ENCOUNTER FOR SURVEILLANCE OF CONTRACEPTIVE PILLS: ICD-10-CM

## 2022-07-12 DIAGNOSIS — O03.9 SAB (SPONTANEOUS ABORTION): Primary | ICD-10-CM

## 2022-07-12 LAB — HCG INTACT+B SERPL-ACNC: 19 MIU/ML

## 2022-07-12 PROCEDURE — 76817 US OB/GYN PROCEDURE (VIEWPOINT): ICD-10-PCS | Mod: S$GLB,,, | Performed by: OBSTETRICS & GYNECOLOGY

## 2022-07-12 PROCEDURE — 99212 OFFICE O/P EST SF 10 MIN: CPT | Mod: S$GLB,,, | Performed by: OBSTETRICS & GYNECOLOGY

## 2022-07-12 PROCEDURE — 99212 PR OFFICE/OUTPT VISIT, EST, LEVL II, 10-19 MIN: ICD-10-PCS | Mod: S$GLB,,, | Performed by: OBSTETRICS & GYNECOLOGY

## 2022-07-12 PROCEDURE — 36415 COLL VENOUS BLD VENIPUNCTURE: CPT | Performed by: OBSTETRICS & GYNECOLOGY

## 2022-07-12 PROCEDURE — 99999 PR PBB SHADOW E&M-EST. PATIENT-LVL III: CPT | Mod: PBBFAC,,, | Performed by: OBSTETRICS & GYNECOLOGY

## 2022-07-12 PROCEDURE — 84702 CHORIONIC GONADOTROPIN TEST: CPT | Performed by: OBSTETRICS & GYNECOLOGY

## 2022-07-12 PROCEDURE — 76817 TRANSVAGINAL US OBSTETRIC: CPT | Mod: S$GLB,,, | Performed by: OBSTETRICS & GYNECOLOGY

## 2022-07-12 PROCEDURE — 99999 PR PBB SHADOW E&M-EST. PATIENT-LVL III: ICD-10-PCS | Mod: PBBFAC,,, | Performed by: OBSTETRICS & GYNECOLOGY

## 2022-07-12 RX ORDER — ACETAMINOPHEN AND CODEINE PHOSPHATE 120; 12 MG/5ML; MG/5ML
1 SOLUTION ORAL DAILY
Qty: 28 TABLET | Refills: 3 | Status: SHIPPED | OUTPATIENT
Start: 2022-07-12 | End: 2022-10-06

## 2022-07-12 NOTE — PROGRESS NOTES
Subjective:       Patient ID: Sabrina Ureña is a 42 y.o. female.    Chief Complaint:  Follow-up      History of Present Illness  HPI  Pt is here for follow up of SAB.  Reports that bleeding has completely stopped and is having no pain.  Is coping well with loss.  Would like to restart Micronor.      GYN & OB History  No LMP recorded.   Date of Last Pap: No result found    OB History    Para Term  AB Living   2 2 1     2   SAB IAB Ectopic Multiple Live Births         0 2      # Outcome Date GA Lbr Diego/2nd Weight Sex Delivery Anes PTL Lv   2 Term 20 38w2d / 00:45 2.855 kg (6 lb 4.7 oz) M Vag-Spont EPI N RAQUEL   1 Para 01   2.268 kg (5 lb) M Vag-Spont None  RAQUEL       Review of Systems  Review of Systems   Constitutional: Negative for activity change, appetite change, chills, fatigue, fever and unexpected weight change.   Respiratory: Negative for shortness of breath.    Cardiovascular: Negative for chest pain, palpitations and leg swelling.   Gastrointestinal: Negative for abdominal pain, bloating, blood in stool, constipation, diarrhea, nausea and vomiting.   Genitourinary: Negative for dysuria, flank pain, frequency, genital sores, hematuria, pelvic pain, urgency, vaginal bleeding, vaginal discharge, vaginal pain, urinary incontinence, vaginal dryness and vaginal odor.   Musculoskeletal: Negative for back pain.   Neurological: Negative for syncope and headaches.   Psychiatric/Behavioral: Negative for depression. The patient is not nervous/anxious.            Objective:    Physical Exam:   Constitutional: She is oriented to person, place, and time. She appears well-developed and well-nourished. No distress.                           Neurological: She is alert and oriented to person, place, and time.     Psychiatric: She has a normal mood and affect. Her behavior is normal. Thought content normal.          US today: No IUP/GS/or retained POC; normal adnexae; no FF    HC/5 - 459   -  155     Assessment:        1. SAB (spontaneous )    2. Encounter for surveillance of contraceptive pills             Plan:      SAB (spontaneous )  -     HCG, Quantitative; Future; Expected date: 2022  -     US today and clinical picture is consistent with completed SAB.  Pt coping well.    Encounter for surveillance of contraceptive pills  -     norethindrone (PACO) 0.35 mg tablet; Take 1 tablet (0.35 mg total) by mouth once daily.  Dispense: 28 tablet; Refill: 3  -     Pt was counseled on contraception options, including associated risks and benefits of each.  Pt voiced understanding and desires to proceed with Micronor.  Medication dosing, side-effects, risks, benefits, and alternatives were discussed.  Medical history was reviewed and pt is a candidate for Micronor use.      Follow up in about 3 months (around 10/12/2022) for Annual exam.

## 2022-07-26 ENCOUNTER — PATIENT MESSAGE (OUTPATIENT)
Dept: INTERNAL MEDICINE | Facility: CLINIC | Age: 42
End: 2022-07-26
Payer: COMMERCIAL

## 2022-07-26 RX ORDER — SCOLOPAMINE TRANSDERMAL SYSTEM 1 MG/1
1 PATCH, EXTENDED RELEASE TRANSDERMAL
Qty: 4 PATCH | Refills: 0 | Status: SHIPPED | OUTPATIENT
Start: 2022-07-26

## 2022-08-18 ENCOUNTER — LAB VISIT (OUTPATIENT)
Dept: LAB | Facility: HOSPITAL | Age: 42
End: 2022-08-18
Attending: FAMILY MEDICINE
Payer: COMMERCIAL

## 2022-08-18 DIAGNOSIS — E03.2 IATROGENIC HYPOTHYROIDISM: ICD-10-CM

## 2022-08-18 LAB — TSH SERPL DL<=0.005 MIU/L-ACNC: 0.31 UIU/ML (ref 0.4–4)

## 2022-08-18 PROCEDURE — 84443 ASSAY THYROID STIM HORMONE: CPT | Performed by: FAMILY MEDICINE

## 2022-08-18 PROCEDURE — 36415 COLL VENOUS BLD VENIPUNCTURE: CPT | Performed by: FAMILY MEDICINE

## 2022-08-18 PROCEDURE — 84439 ASSAY OF FREE THYROXINE: CPT | Performed by: FAMILY MEDICINE

## 2022-08-19 LAB — T4 FREE SERPL-MCNC: 1.12 NG/DL (ref 0.71–1.51)

## 2022-09-12 DIAGNOSIS — E03.2 IATROGENIC HYPOTHYROIDISM: Primary | ICD-10-CM

## 2022-09-14 ENCOUNTER — TELEPHONE (OUTPATIENT)
Dept: INTERNAL MEDICINE | Facility: CLINIC | Age: 42
End: 2022-09-14
Payer: COMMERCIAL

## 2022-10-05 ENCOUNTER — TELEPHONE (OUTPATIENT)
Dept: INTERNAL MEDICINE | Facility: CLINIC | Age: 42
End: 2022-10-05
Payer: COMMERCIAL

## 2022-10-09 RX ORDER — LEVOTHYROXINE SODIUM 88 UG/1
88 TABLET ORAL
Qty: 90 TABLET | Refills: 4 | Status: SHIPPED | OUTPATIENT
Start: 2022-10-09 | End: 2023-11-01

## 2022-11-15 ENCOUNTER — TELEPHONE (OUTPATIENT)
Dept: OBSTETRICS AND GYNECOLOGY | Facility: CLINIC | Age: 42
End: 2022-11-15
Payer: COMMERCIAL

## 2022-11-15 NOTE — TELEPHONE ENCOUNTER
Attempted to call patient. Left message on voicemail to return call to the clinic. MyChart message sent also. Need to cancel appointment Friday afternoon, can be seen same time Thursday afternoon at [place] location.

## 2022-12-30 ENCOUNTER — IMMUNIZATION (OUTPATIENT)
Dept: INTERNAL MEDICINE | Facility: CLINIC | Age: 42
End: 2022-12-30
Payer: COMMERCIAL

## 2022-12-30 PROCEDURE — 90686 FLU VACCINE (QUAD) GREATER THAN OR EQUAL TO 3YO PRESERVATIVE FREE IM: ICD-10-PCS | Mod: S$GLB,,, | Performed by: FAMILY MEDICINE

## 2022-12-30 PROCEDURE — 90686 IIV4 VACC NO PRSV 0.5 ML IM: CPT | Mod: S$GLB,,, | Performed by: FAMILY MEDICINE

## 2022-12-30 PROCEDURE — 90471 IMMUNIZATION ADMIN: CPT | Mod: S$GLB,,, | Performed by: FAMILY MEDICINE

## 2022-12-30 PROCEDURE — 90471 FLU VACCINE (QUAD) GREATER THAN OR EQUAL TO 3YO PRESERVATIVE FREE IM: ICD-10-PCS | Mod: S$GLB,,, | Performed by: FAMILY MEDICINE

## 2023-01-25 ENCOUNTER — PATIENT MESSAGE (OUTPATIENT)
Dept: ADMINISTRATIVE | Facility: HOSPITAL | Age: 43
End: 2023-01-25
Payer: COMMERCIAL

## 2023-03-08 NOTE — ANESTHESIA POSTPROCEDURE EVALUATION
Anesthesia Post Evaluation    Patient: Sabrina Ureña    Procedure(s) Performed: * No procedures listed *    Final Anesthesia Type: epidural    Patient location during evaluation: labor & delivery  Patient participation: Yes- Able to Participate  Level of consciousness: awake and alert  Post-procedure vital signs: reviewed and stable  Pain management: adequate  Airway patency: patent    PONV status at discharge: No PONV  Anesthetic complications: no      Cardiovascular status: blood pressure returned to baseline  Respiratory status: unassisted and spontaneous ventilation  Hydration status: euvolemic  Follow-up not needed.          Vitals Value Taken Time   /66 5/10/2020 12:18 AM   Temp 36.8 °C (98.3 °F) 5/10/2020 12:18 AM   Pulse 87 5/10/2020 12:18 AM   Resp 18 5/10/2020 12:18 AM   SpO2 100 % 5/9/2020  5:50 PM   Vitals shown include unvalidated device data.      No case tracking events are documented in the log.      Pain/Didier Score: Pain Rating Prior to Med Admin: 2 (5/10/2020 12:37 AM)  Pain Rating Post Med Admin: 0 (5/9/2020  8:00 PM)        
Statement Selected

## 2023-04-02 ENCOUNTER — PATIENT MESSAGE (OUTPATIENT)
Dept: ADMINISTRATIVE | Facility: HOSPITAL | Age: 43
End: 2023-04-02
Payer: COMMERCIAL

## 2023-04-19 ENCOUNTER — PATIENT MESSAGE (OUTPATIENT)
Dept: ADMINISTRATIVE | Facility: HOSPITAL | Age: 43
End: 2023-04-19
Payer: COMMERCIAL

## 2023-04-21 ENCOUNTER — PATIENT MESSAGE (OUTPATIENT)
Dept: ADMINISTRATIVE | Facility: HOSPITAL | Age: 43
End: 2023-04-21
Payer: COMMERCIAL

## 2023-04-21 DIAGNOSIS — Z12.11 SCREENING FOR COLON CANCER: ICD-10-CM

## 2023-06-01 ENCOUNTER — LAB VISIT (OUTPATIENT)
Dept: LAB | Facility: HOSPITAL | Age: 43
End: 2023-06-01
Attending: FAMILY MEDICINE
Payer: COMMERCIAL

## 2023-06-01 DIAGNOSIS — I10 ESSENTIAL HYPERTENSION: ICD-10-CM

## 2023-06-01 DIAGNOSIS — E03.2 IATROGENIC HYPOTHYROIDISM: ICD-10-CM

## 2023-06-01 LAB
ALBUMIN SERPL BCP-MCNC: 4.2 G/DL (ref 3.5–5.2)
ALP SERPL-CCNC: 60 U/L (ref 55–135)
ALT SERPL W/O P-5'-P-CCNC: 15 U/L (ref 10–44)
ANION GAP SERPL CALC-SCNC: 7 MMOL/L (ref 8–16)
AST SERPL-CCNC: 16 U/L (ref 10–40)
BILIRUB SERPL-MCNC: 0.4 MG/DL (ref 0.1–1)
BUN SERPL-MCNC: 16 MG/DL (ref 6–20)
CALCIUM SERPL-MCNC: 9.8 MG/DL (ref 8.7–10.5)
CHLORIDE SERPL-SCNC: 105 MMOL/L (ref 95–110)
CHOLEST SERPL-MCNC: 235 MG/DL (ref 120–199)
CHOLEST/HDLC SERPL: 4.1 {RATIO} (ref 2–5)
CO2 SERPL-SCNC: 27 MMOL/L (ref 23–29)
CREAT SERPL-MCNC: 0.8 MG/DL (ref 0.5–1.4)
EST. GFR  (NO RACE VARIABLE): >60 ML/MIN/1.73 M^2
GLUCOSE SERPL-MCNC: 91 MG/DL (ref 70–110)
HDLC SERPL-MCNC: 57 MG/DL (ref 40–75)
HDLC SERPL: 24.3 % (ref 20–50)
LDLC SERPL CALC-MCNC: 159.6 MG/DL (ref 63–159)
NONHDLC SERPL-MCNC: 178 MG/DL
POTASSIUM SERPL-SCNC: 4.2 MMOL/L (ref 3.5–5.1)
PROT SERPL-MCNC: 8.1 G/DL (ref 6–8.4)
SODIUM SERPL-SCNC: 139 MMOL/L (ref 136–145)
TRIGL SERPL-MCNC: 92 MG/DL (ref 30–150)
TSH SERPL DL<=0.005 MIU/L-ACNC: 1.19 UIU/ML (ref 0.4–4)

## 2023-06-01 PROCEDURE — 36415 COLL VENOUS BLD VENIPUNCTURE: CPT | Performed by: FAMILY MEDICINE

## 2023-06-01 PROCEDURE — 80053 COMPREHEN METABOLIC PANEL: CPT | Performed by: FAMILY MEDICINE

## 2023-06-01 PROCEDURE — 80061 LIPID PANEL: CPT | Performed by: FAMILY MEDICINE

## 2023-06-01 PROCEDURE — 84443 ASSAY THYROID STIM HORMONE: CPT | Performed by: FAMILY MEDICINE

## 2023-06-08 ENCOUNTER — OFFICE VISIT (OUTPATIENT)
Dept: INTERNAL MEDICINE | Facility: CLINIC | Age: 43
End: 2023-06-08
Payer: COMMERCIAL

## 2023-06-08 VITALS
OXYGEN SATURATION: 99 % | DIASTOLIC BLOOD PRESSURE: 70 MMHG | BODY MASS INDEX: 30.52 KG/M2 | HEIGHT: 60 IN | HEART RATE: 99 BPM | SYSTOLIC BLOOD PRESSURE: 110 MMHG | WEIGHT: 155.44 LBS | TEMPERATURE: 98 F

## 2023-06-08 DIAGNOSIS — Z00.00 ROUTINE HEALTH MAINTENANCE: Primary | ICD-10-CM

## 2023-06-08 DIAGNOSIS — I10 ESSENTIAL HYPERTENSION: ICD-10-CM

## 2023-06-08 DIAGNOSIS — Z12.31 ENCOUNTER FOR SCREENING MAMMOGRAM FOR MALIGNANT NEOPLASM OF BREAST: ICD-10-CM

## 2023-06-08 DIAGNOSIS — Z12.11 SCREEN FOR COLON CANCER: ICD-10-CM

## 2023-06-08 DIAGNOSIS — E03.2 IATROGENIC HYPOTHYROIDISM: ICD-10-CM

## 2023-06-08 PROCEDURE — 99396 PR PREVENTIVE VISIT,EST,40-64: ICD-10-PCS | Mod: S$GLB,,, | Performed by: FAMILY MEDICINE

## 2023-06-08 PROCEDURE — 99999 PR PBB SHADOW E&M-EST. PATIENT-LVL IV: ICD-10-PCS | Mod: PBBFAC,,, | Performed by: FAMILY MEDICINE

## 2023-06-08 PROCEDURE — 99999 PR PBB SHADOW E&M-EST. PATIENT-LVL IV: CPT | Mod: PBBFAC,,, | Performed by: FAMILY MEDICINE

## 2023-06-08 PROCEDURE — 99396 PREV VISIT EST AGE 40-64: CPT | Mod: S$GLB,,, | Performed by: FAMILY MEDICINE

## 2023-06-08 NOTE — PATIENT INSTRUCTIONS
Wegovy- the same as ozempic but specifically approved for wt loss  Diabetes meds also helps with weight loss: Ozempic or trulicity  Mounjaro (close relative that is even more effective for weight loss)    There is also Dennis (over counter fat blocker) can help some with weight loss     Recommend covid booster via pharmacy

## 2023-06-08 NOTE — PROGRESS NOTES
Subjective:       Patient ID: Sabrina Ureña is a . female.    Chief Complaint: here for physical examination and issues  below    HPI Hypertension: blood pressures at home normal. Tolerating medicine.   Hypothyroidism: nl tsh. Tolerating med. Asympt    Elev chol with wt gain    Obesity bmi 31: Some wt gain since changed job f [employer] back to prior job withmore sitting  She asks about w tloss meds and advice. D/wd options but most not covered by insur. D/wd wt watchers.avoiding fast good and rec regular exerc      Past Medical History:   Diagnosis Date    Abnormal  test 2020    Abnormal glucose in pregnancy, antepartum 3/9/2020    Elderly multigravida, currently pregnant 1/3/2020    Weekly BPP after 30 weeks    First degree perineal laceration during delivery 2020    Hypertension     Hypertension affecting pregnancy in third trimester 2020    Hyperthyroidism     s/p radioact thy ablation    Iatrogenic hypothyroidism      Past Surgical History:   Procedure Laterality Date    breast augmentation      radioactive ablation thyroid           Family History   Problem Relation Age of Onset    Hyperlipidemia Mother     Hypertension Mother     Gout Father      Social History     Socioeconomic History    Marital status:      Spouse name: Not on file    Number of children: 1    Years of education: Not on file    Highest education level: Not on file   Occupational History    Not on file   Tobacco Use    Smoking status: Never Smoker    Smokeless tobacco: Never Used   Substance and Sexual Activity    Alcohol use: No    Drug use: No    Sexual activity: Yes     Partners: Male     Birth control/protection: None   Other Topics Concern    Not on file   Social History Narrative     marriage 2015    2nd child 13 months old as of     Other child 21 will be     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     Social Determinants of Health     Financial Resource Strain:      Difficulty of Paying Living Expenses:    Food Insecurity:     Worried About Running Out of Food in the Last Year:     Ran Out of Food in the Last Year:    Transportation Needs:     Lack of Transportation (Medical):     Lack of Transportation (Non-Medical):    Physical Activity:     Days of Exercise per Week:     Minutes of Exercise per Session:    Stress:     Feeling of Stress :    Social Connections:     Frequency of Communication with Friends and Family:     Frequency of Social Gatherings with Friends and Family:     Attends [employer] Services:     Active Member of Clubs or Organizations:     Attends Club or Organization Meetings:     Marital Status:          Review of Systems    Cardiovascular: no chest pain  Chest: no shortness of breath  Abd: no abd pain  Remainder review of systems negative    Objective:      Physical Exam  Vitals and nursing note reviewed.   Constitutional:       General: She is not in acute distress.     Appearance: She is well-developed.   HENT:      Head: Atraumatic.      Right Ear: External ear normal.      Left Ear: External ear normal.      Nose: Nose normal.      Mouth/Throat:      Pharynx: No oropharyngeal exudate.   Eyes:      General: No scleral icterus.     Conjunctiva/sclera: Conjunctivae normal.      Pupils: Pupils are equal, round, and reactive to light.   Neck:      Thyroid: No thyromegaly.   Cardiovascular:      Rate and Rhythm: Normal rate and regular rhythm.      Heart sounds: Normal heart sounds. No murmur heard.     Pulmonary:      Effort: Pulmonary effort is normal. No respiratory distress.      Breath sounds: Normal breath sounds. No wheezing or rales.   Abdominal:      General: Bowel sounds are normal. There is no distension.      Palpations: Abdomen is soft. There is no mass.      Tenderness: There is no abdominal tenderness. There is no guarding or rebound.   Musculoskeletal:         General: No tenderness. Normal range of motion.      Cervical back: Normal range of  motion and neck supple.   Lymphadenopathy:      Cervical: No cervical adenopathy.   Skin:     General: Skin is warm.      Coloration: Skin is not pale.      Findings: No erythema or rash.   Neurological:      Mental Status: She is alert and oriented to person, place, and time.      Cranial Nerves: No cranial nerve deficit.      Motor: No abnormal muscle tone.      Coordination: Coordination normal.   Psychiatric:         Behavior: Behavior normal.         Thought Content: Thought content normal.         Judgment: Judgment normal.           Assessment:       1. Routine health maintenance    2. Essential hypertension    3. Iatrogenic hypothyroidism            Hyperchol  obesity  Plan:     Wt loss/exerc  Lab 12 months and follow up after    Routine health maintenance  -     Hemoglobin A1C; Future; Expected date: 06/08/2024    Iatrogenic hypothyroidism  -     TSH; Future; Expected date: 06/07/2024    Essential hypertension  -     Comprehensive Metabolic Panel; Future; Expected date: 06/07/2024  -     Lipid Panel; Future; Expected date: 06/07/2024    Screen for colon cancer  -     Cologuard Screening (Multitarget Stool DNA); Future; Expected date: 06/08/2023;prefers over cscope    Encounter for screening mammogram for malignant neoplasm of breast  -     Mammo Digital Screening Bilat w/ [facility]; Future; Expected date: 06/08/2023    She plans to sched pap w gyn

## 2023-08-02 ENCOUNTER — HOSPITAL ENCOUNTER (OUTPATIENT)
Dept: RADIOLOGY | Facility: HOSPITAL | Age: 43
Discharge: HOME | End: 2023-08-02
Attending: FAMILY MEDICINE
Payer: COMMERCIAL

## 2023-08-02 DIAGNOSIS — Z12.31 ENCOUNTER FOR SCREENING MAMMOGRAM FOR MALIGNANT NEOPLASM OF BREAST: ICD-10-CM

## 2023-08-02 PROCEDURE — 77063 BREAST TOMOSYNTHESIS BI: CPT | Mod: 26,,, | Performed by: RADIOLOGY

## 2023-08-02 PROCEDURE — 77067 SCR MAMMO BI INCL CAD: CPT | Mod: TC

## 2023-08-02 PROCEDURE — 77067 MAMMO DIGITAL SCREENING BILAT WITH TOMO: ICD-10-PCS | Mod: 26,,, | Performed by: RADIOLOGY

## 2023-08-02 PROCEDURE — 77063 MAMMO DIGITAL SCREENING BILAT WITH TOMO: ICD-10-PCS | Mod: 26,,, | Performed by: RADIOLOGY

## 2023-08-02 PROCEDURE — 77067 SCR MAMMO BI INCL CAD: CPT | Mod: 26,,, | Performed by: RADIOLOGY

## 2023-08-24 DIAGNOSIS — I10 ESSENTIAL HYPERTENSION: ICD-10-CM

## 2023-08-24 RX ORDER — LOSARTAN POTASSIUM AND HYDROCHLOROTHIAZIDE 12.5; 1 MG/1; MG/1
TABLET ORAL
Qty: 90 TABLET | Refills: 3 | Status: SHIPPED | OUTPATIENT
Start: 2023-08-24

## 2023-08-24 NOTE — TELEPHONE ENCOUNTER
Care Due:                  Date            Visit Type   Department     Provider  --------------------------------------------------------------------------------                                EP -                              PRIMARY      [facility]  Last Visit: 06-      CARE ([facility])   CAITIE Telles                              EP -                              PRIMARY      [facility]  Next Visit: 06-      CARE ([facility])   CAITIE Telles                                                            Last  Test          Frequency    Reason                     Performed    Due Date  --------------------------------------------------------------------------------    CBC.........  12 months..  acyclovir................  07- 07-    [facility] Embedded Care Due Messages. Reference number: [id number].   8/24/2023 12:13:02 AM CDT

## 2023-08-24 NOTE — TELEPHONE ENCOUNTER
Provider Staff:  Action required for this patient     Please see care gap opportunities below in Care Due Message.    Thanks!  [facility]     Appointments      Date Provider   Last Visit   6/8/2023 Alexys Telles MD   Next Visit   Visit date not found Alexys Telles MD     Refill Decision Note   Sabrina Ureña  is requesting a refill authorization.  Brief Assessment and Rationale for Refill:  Approve     Medication Therapy Plan:         Comments:     Note composed:11:46 AM 08/24/2023             Appointments     Last Visit   6/8/2023 Alexys Telles MD   Next Visit   Visit date not found Alexys Telles MD

## 2023-09-07 ENCOUNTER — OFFICE VISIT (OUTPATIENT)
Dept: OBSTETRICS AND GYNECOLOGY | Facility: CLINIC | Age: 43
End: 2023-09-07
Payer: COMMERCIAL

## 2023-09-07 VITALS
HEIGHT: 60 IN | DIASTOLIC BLOOD PRESSURE: 80 MMHG | BODY MASS INDEX: 31.12 KG/M2 | WEIGHT: 158.5 LBS | SYSTOLIC BLOOD PRESSURE: 122 MMHG

## 2023-09-07 DIAGNOSIS — Z12.4 PAP SMEAR FOR CERVICAL CANCER SCREENING: ICD-10-CM

## 2023-09-07 DIAGNOSIS — Z30.41 ENCOUNTER FOR SURVEILLANCE OF CONTRACEPTIVE PILLS: Primary | ICD-10-CM

## 2023-09-07 PROCEDURE — 99396 PR PREVENTIVE VISIT,EST,40-64: ICD-10-PCS | Mod: S$GLB,,, | Performed by: OBSTETRICS & GYNECOLOGY

## 2023-09-07 PROCEDURE — 87624 HPV HI-RISK TYP POOLED RSLT: CPT | Performed by: OBSTETRICS & GYNECOLOGY

## 2023-09-07 PROCEDURE — 88175 CYTOPATH C/V AUTO FLUID REDO: CPT | Performed by: OBSTETRICS & GYNECOLOGY

## 2023-09-07 PROCEDURE — 99999 PR PBB SHADOW E&M-EST. PATIENT-LVL III: ICD-10-PCS | Mod: PBBFAC,,, | Performed by: OBSTETRICS & GYNECOLOGY

## 2023-09-07 PROCEDURE — 99396 PREV VISIT EST AGE 40-64: CPT | Mod: S$GLB,,, | Performed by: OBSTETRICS & GYNECOLOGY

## 2023-09-07 PROCEDURE — 99999 PR PBB SHADOW E&M-EST. PATIENT-LVL III: CPT | Mod: PBBFAC,,, | Performed by: OBSTETRICS & GYNECOLOGY

## 2023-09-07 RX ORDER — ACETAMINOPHEN AND CODEINE PHOSPHATE 120; 12 MG/5ML; MG/5ML
1 SOLUTION ORAL DAILY
Qty: 84 TABLET | Refills: 3 | Status: SHIPPED | OUTPATIENT
Start: 2023-09-07

## 2023-09-07 NOTE — PROGRESS NOTES
Subjective:      Patient ID: Sabrina Ureña is a 43 y.o. female.    Chief Complaint:  Annual Exam      History of Present Illness  HPI  Annual Exam-Premenopausal  Patient presents for annual exam. The patient has no complaints today. The patient is sexually active. GYN screening history: last pap: approximate date  and was normal and last mammogram: approximate date  and was normal. The patient wears seatbelts: yes. The patient participates in regular exercise: yes. Has the patient ever been transfused or tattooed?: yes. The patient reports that there is not domestic violence in her life.  Doing well with Micronor.  Needs refill.    GYN & OB History  Patient's last menstrual period was 2023 (approximate).   Date of Last Pap: No result found    OB History    Para Term  AB Living   2 2 1     2   SAB IAB Ectopic Multiple Live Births         0 2      # Outcome Date GA Lbr Diego/2nd Weight Sex Delivery Anes PTL Lv   2 Term 20 38w2d / 00:45 2.855 kg (6 lb 4.7 oz) M Vag-Spont EPI N RAQUEL   1 Para 01   2.268 kg (5 lb) M Vag-Spont None  RAQUEL       Review of Systems  Review of Systems   Constitutional:  Positive for unexpected weight change. Negative for activity change, appetite change, chills, fatigue and fever.   Respiratory:  Negative for shortness of breath.    Cardiovascular:  Negative for chest pain, palpitations and leg swelling.   Gastrointestinal:  Negative for abdominal pain, bloating, blood in stool, constipation, diarrhea, nausea and vomiting.   Genitourinary:  Negative for dysmenorrhea, dyspareunia, dysuria, flank pain, frequency, genital sores, hematuria, menorrhagia, menstrual problem, pelvic pain, urgency, vaginal bleeding, vaginal discharge, vaginal pain, urinary incontinence, postcoital bleeding, vaginal dryness and vaginal odor.   Musculoskeletal:  Negative for back pain.   Integumentary:  Negative for breast mass, nipple discharge, breast skin changes and breast  tenderness.   Neurological:  Negative for syncope and headaches.   Breast: Negative for asymmetry, lump, mass, mastodynia, nipple discharge, skin changes and tenderness         Objective:     Physical Exam:   Constitutional: She is oriented to person, place, and time. She appears well-developed and well-nourished. No distress.    HENT:   Head: Normocephalic and atraumatic.    Eyes: Pupils are equal, round, and reactive to light. EOM are normal.     Cardiovascular:  Normal rate, regular rhythm and normal heart sounds.             Pulmonary/Chest: Effort normal and breath sounds normal.        Abdominal: Soft. Bowel sounds are normal. She exhibits no distension. There is no abdominal tenderness.     Genitourinary:    Vagina, uterus, right adnexa and left adnexa normal.      Pelvic exam was performed with patient supine.   There is no rash, tenderness, lesion or injury on the right labia. There is no rash, tenderness, lesion or injury on the left labia. Cervix is normal. Right adnexum displays no mass, no tenderness and no fullness. Left adnexum displays no mass, no tenderness and no fullness. No erythema,  no vaginal discharge, tenderness or bleeding in the vagina.    No foreign body in the vagina.      No signs of injury in the vagina.   Cervix exhibits no motion tenderness, no discharge and no friability. Uterus is not deviated, not enlarged and not tender.           Musculoskeletal: Normal range of motion and moves all extremeties. No tenderness or edema.       Neurological: She is alert and oriented to person, place, and time.    Skin: Skin is warm and dry.    Psychiatric: She has a normal mood and affect. Her behavior is normal. Thought content normal.         Assessment:     1. Encounter for surveillance of contraceptive pills    2. Pap smear for cervical cancer screening               Plan:     Encounter for surveillance of contraceptive pills  -     norethindrone (PACO) 0.35 mg tablet; Take 1 tablet (0.35 mg  total) by mouth once daily.  Dispense: 84 tablet; Refill: 3  -     Doing well with Micronor.  Continue use.    Pap smear for cervical cancer screening  -     Liquid-Based Pap Smear, Screening  -     HPV High Risk Genotypes, PCR  -     Pt was counseled on cervical/vaginal screening guidelines and recommendations.  Last pap NILM on 2019.  If today's pap smear result is negative, next pap smear will be due in 3-5 yrs.  -     Pt was advised on current breast cancer screening recommendations.  Pt desires to proceed with screening MMG.  -      Follow up with PCP for routine health maintenance needs.      Follow up in about 1 year (around 9/7/2024).

## 2023-09-12 ENCOUNTER — PATIENT MESSAGE (OUTPATIENT)
Dept: INTERNAL MEDICINE | Facility: CLINIC | Age: 43
End: 2023-09-12
Payer: COMMERCIAL

## 2023-09-20 RX ORDER — SEMAGLUTIDE 0.68 MG/ML
0.25 INJECTION, SOLUTION SUBCUTANEOUS
Qty: 3 ML | Refills: 0 | Status: SHIPPED | OUTPATIENT
Start: 2023-09-20

## 2023-10-26 NOTE — TELEPHONE ENCOUNTER
----- Message from Christiana Mortensen sent at 10/3/2022 12:38 PM CDT -----  Contact: Jeffrey/ [facility]  Type:  Pharmacy Calling to Clarify an RX    Name of Caller: Jeffrey  Pharmacy Name: [facility]  Prescription Name: Synthroid  What do they need to clarify?: Coupon only covers name brand, so he is needing it sent in with a NOEMI 1 code  Best Call Back Number: [phone]  Additional Information:          Pt had reaction to IV cipro 10/25. Pt developed hives at IV site and itching in b/l upper extremities. Pt received 120mg total IV solumedrol and 50mg PO benadryl. No c/o itching. Pt placed on remote tele for cardiac monitoring. Pt had reaction to IV cipro 10/25. Pt developed hives at IV site and itching in b/l upper extremities. Pt received 125mg total IV solumedrol and 50mg PO benadryl. No c/o itching. Pt placed on remote tele for cardiac monitoring.

## 2023-11-01 RX ORDER — LEVOTHYROXINE SODIUM 88 UG/1
88 TABLET ORAL
Qty: 90 TABLET | Refills: 2 | Status: SHIPPED | OUTPATIENT
Start: 2023-11-01

## 2023-11-01 NOTE — TELEPHONE ENCOUNTER
Care Due:                  Date            Visit Type   Department     Provider  --------------------------------------------------------------------------------                                EP -                              PRIMARY      [facility]  Last Visit: 06-      CARE ([facility])   CAITIE Telles                              EP -                              PRIMARY      [facility]  Next Visit: 06-      [facility] ([facility])   CAITIE Telles                                                            Last  Test          Frequency    Reason                     Performed    Due Date  --------------------------------------------------------------------------------    CBC.........  12 months..  acyclovir................  07- 07-    HBA1C.......  6 months...  semaglutide..............  Not Found    Overdue    [employer] Embedded Care Due Messages. Reference number: [id number].   11/01/2023 12:11:24 AM CDT

## 2023-11-01 NOTE — TELEPHONE ENCOUNTER
Provider Staff:  Action required for this patient     Please see care gap opportunities below in Care Due Message.    Thanks!  [facility]     Appointments      Date Provider   Last Visit   6/8/2023 Alexys Telles MD   Next Visit   Visit date not found Alexys Telles MD     Refill Decision Note   Sabrina Ureña  is requesting a refill authorization.  Brief Assessment and Rationale for Refill:  Approve     Medication Therapy Plan:  CBC; TSH-WNL      Comments:     Note composed:9:40 AM 11/01/2023             Appointments     Last Visit   6/8/2023 Alexys Telles MD   Next Visit   Visit date not found Alexys Telles MD

## 2023-11-16 ENCOUNTER — IMMUNIZATION (OUTPATIENT)
Dept: INTERNAL MEDICINE | Facility: CLINIC | Age: 43
End: 2023-11-16
Payer: COMMERCIAL

## 2023-11-16 DIAGNOSIS — Z23 NEED FOR VACCINATION: Primary | ICD-10-CM

## 2023-11-16 PROCEDURE — 90686 IIV4 VACC NO PRSV 0.5 ML IM: CPT | Mod: S$GLB,,, | Performed by: FAMILY MEDICINE

## 2023-11-16 PROCEDURE — 90471 FLU VACCINE (QUAD) GREATER THAN OR EQUAL TO 3YO PRESERVATIVE FREE IM: ICD-10-PCS | Mod: S$GLB,,, | Performed by: FAMILY MEDICINE

## 2023-11-16 PROCEDURE — 90471 IMMUNIZATION ADMIN: CPT | Mod: S$GLB,,, | Performed by: FAMILY MEDICINE

## 2023-11-16 PROCEDURE — 90686 FLU VACCINE (QUAD) GREATER THAN OR EQUAL TO 3YO PRESERVATIVE FREE IM: ICD-10-PCS | Mod: S$GLB,,, | Performed by: FAMILY MEDICINE

## 2024-01-16 NOTE — TELEPHONE ENCOUNTER
Care Due:                  Date            Visit Type   Department     Provider  --------------------------------------------------------------------------------                                EP -                              PRIMARY      [facility]  Last Visit: 06-      CARE ([facility])   CAITIE Telles                              EP -                              PRIMARY      [facility]  Next Visit: 06-      CARE ([facility])   CAITIE Telles                                                            Last  Test          Frequency    Reason                     Performed    Due Date  --------------------------------------------------------------------------------    HBA1C.......  6 months...  semaglutide..............  Not Found    Overdue    [employer] Embedded Care Due Messages. Reference number: [id number].   1/16/2024 4:36:43 PM CST

## 2024-01-17 NOTE — TELEPHONE ENCOUNTER
Refill Routing Note   Medication(s) are not appropriate for processing by [facility] for the following reason(s):        Required labs outdated: CBC    [facility] action(s):  Defer     Requires labs : Yes    Medication Therapy Plan:         Appointments  past 12m or future 3m with PCP    Date Provider   Last Visit   6/8/2023 Alexys Telles MD   Next Visit   6/10/2024 Alexys Telles MD   ED visits in past 90 days: 0        Note composed:1:05 PM 01/17/2024

## 2024-01-21 RX ORDER — ACYCLOVIR 400 MG/1
400 TABLET ORAL 2 TIMES DAILY
Qty: 10 TABLET | Refills: 11 | Status: SHIPPED | OUTPATIENT
Start: 2024-01-21

## 2024-06-03 ENCOUNTER — LAB VISIT (OUTPATIENT)
Dept: LAB | Facility: HOSPITAL | Age: 44
End: 2024-06-03
Attending: FAMILY MEDICINE
Payer: COMMERCIAL

## 2024-06-03 DIAGNOSIS — Z00.00 ROUTINE HEALTH MAINTENANCE: ICD-10-CM

## 2024-06-03 DIAGNOSIS — E03.2 IATROGENIC HYPOTHYROIDISM: ICD-10-CM

## 2024-06-03 DIAGNOSIS — I10 ESSENTIAL HYPERTENSION: ICD-10-CM

## 2024-06-03 LAB
ALBUMIN SERPL BCP-MCNC: 3.9 G/DL (ref 3.5–5.2)
ALP SERPL-CCNC: 55 U/L (ref 55–135)
ALT SERPL W/O P-5'-P-CCNC: 14 U/L (ref 10–44)
ANION GAP SERPL CALC-SCNC: 11 MMOL/L (ref 8–16)
AST SERPL-CCNC: 17 U/L (ref 10–40)
BILIRUB SERPL-MCNC: 0.3 MG/DL (ref 0.1–1)
BUN SERPL-MCNC: 19 MG/DL (ref 6–20)
CALCIUM SERPL-MCNC: 9.5 MG/DL (ref 8.7–10.5)
CHLORIDE SERPL-SCNC: 105 MMOL/L (ref 95–110)
CHOLEST SERPL-MCNC: 217 MG/DL (ref 120–199)
CHOLEST/HDLC SERPL: 4.3 {RATIO} (ref 2–5)
CO2 SERPL-SCNC: 22 MMOL/L (ref 23–29)
CREAT SERPL-MCNC: 0.8 MG/DL (ref 0.5–1.4)
EST. GFR  (NO RACE VARIABLE): >60 ML/MIN/1.73 M^2
ESTIMATED AVG GLUCOSE: 111 MG/DL (ref 68–131)
GLUCOSE SERPL-MCNC: 84 MG/DL (ref 70–110)
HBA1C MFR BLD: 5.5 % (ref 4–5.6)
HDLC SERPL-MCNC: 50 MG/DL (ref 40–75)
HDLC SERPL: 23 % (ref 20–50)
LDLC SERPL CALC-MCNC: 144.2 MG/DL (ref 63–159)
NONHDLC SERPL-MCNC: 167 MG/DL
POTASSIUM SERPL-SCNC: 3.6 MMOL/L (ref 3.5–5.1)
PROT SERPL-MCNC: 8.1 G/DL (ref 6–8.4)
SODIUM SERPL-SCNC: 138 MMOL/L (ref 136–145)
TRIGL SERPL-MCNC: 114 MG/DL (ref 30–150)
TSH SERPL DL<=0.005 MIU/L-ACNC: 1.73 UIU/ML (ref 0.4–4)

## 2024-06-03 PROCEDURE — 83036 HEMOGLOBIN GLYCOSYLATED A1C: CPT | Performed by: FAMILY MEDICINE

## 2024-06-03 PROCEDURE — 80061 LIPID PANEL: CPT | Performed by: FAMILY MEDICINE

## 2024-06-03 PROCEDURE — 84443 ASSAY THYROID STIM HORMONE: CPT | Performed by: FAMILY MEDICINE

## 2024-06-03 PROCEDURE — 80053 COMPREHEN METABOLIC PANEL: CPT | Performed by: FAMILY MEDICINE

## 2024-06-03 PROCEDURE — 36415 COLL VENOUS BLD VENIPUNCTURE: CPT | Performed by: FAMILY MEDICINE

## 2024-06-10 ENCOUNTER — OFFICE VISIT (OUTPATIENT)
Dept: INTERNAL MEDICINE | Facility: CLINIC | Age: 44
End: 2024-06-10
Payer: COMMERCIAL

## 2024-06-10 VITALS
HEART RATE: 75 BPM | OXYGEN SATURATION: 99 % | TEMPERATURE: 97 F | WEIGHT: 158.06 LBS | SYSTOLIC BLOOD PRESSURE: 128 MMHG | RESPIRATION RATE: 18 BRPM | DIASTOLIC BLOOD PRESSURE: 74 MMHG | HEIGHT: 60 IN | BODY MASS INDEX: 31.03 KG/M2

## 2024-06-10 DIAGNOSIS — E03.2 IATROGENIC HYPOTHYROIDISM: ICD-10-CM

## 2024-06-10 DIAGNOSIS — Z12.31 ENCOUNTER FOR SCREENING MAMMOGRAM FOR MALIGNANT NEOPLASM OF BREAST: ICD-10-CM

## 2024-06-10 DIAGNOSIS — I10 ESSENTIAL HYPERTENSION: ICD-10-CM

## 2024-06-10 DIAGNOSIS — Z12.11 SCREEN FOR COLON CANCER: ICD-10-CM

## 2024-06-10 DIAGNOSIS — Z00.00 ROUTINE HEALTH MAINTENANCE: Primary | ICD-10-CM

## 2024-06-10 PROCEDURE — 99396 PREV VISIT EST AGE 40-64: CPT | Mod: S$GLB,,, | Performed by: FAMILY MEDICINE

## 2024-06-10 PROCEDURE — 99999 PR PBB SHADOW E&M-EST. PATIENT-LVL IV: CPT | Mod: PBBFAC,,, | Performed by: FAMILY MEDICINE

## 2024-06-10 NOTE — PROGRESS NOTES
Subjective:       Patient ID: Sabrina Ureña is a . female.    Chief Complaint: here for physical examination and issues  below    HPI Hypertension: blood pressures at home normal. Tolerating medicine.   Hypothyroidism: nl tsh. Tolerating med. Asympt    Elev chol with wt gain;cv risk 2%    Obesity bmi 31: hxe wt gain since changed job f [employer] back to prior job withmore sitting;had rxd ozempkc. Not sure if insur coverd;she wants to try off.   hx. D/wd wt watchers.avoiding fast good and rec regular exerc;walking at work  Starting pickleball      Past Medical History:   Diagnosis Date    Abnormal  test 2020    Abnormal glucose in pregnancy, antepartum 3/9/2020    Elderly multigravida, currently pregnant 1/3/2020    Weekly BPP after 30 weeks    First degree perineal laceration during delivery 2020    Hypertension     Hypertension affecting pregnancy in third trimester 2020    Hyperthyroidism     s/p radioact thy ablation    Iatrogenic hypothyroidism      Past Surgical History:   Procedure Laterality Date    breast augmentation      radioactive ablation thyroid           Family History   Problem Relation Age of Onset    Hyperlipidemia Mother     Hypertension Mother     Gout Father      Social History     Socioeconomic History    Marital status:      Spouse name: Not on file    Number of children: 1    Years of education: Not on file    Highest education level: Not on file   Occupational History    Not on file   Tobacco Use    Smoking status: Never Smoker    Smokeless tobacco: Never Used   Substance and Sexual Activity    Alcohol use: No    Drug use: No    Sexual activity: Yes     Partners: Male     Birth control/protection: None   Other Topics Concern    Not on file   Social History Narrative     marriage 2015    2nd child 13 months old as of     Other child 21 will be     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     Social Determinants of Health      Financial Resource Strain:     Difficulty of Paying Living Expenses:    Food Insecurity:     Worried About Running Out of Food in the Last Year:     Ran Out of Food in the Last Year:    Transportation Needs:     Lack of Transportation (Medical):     Lack of Transportation (Non-Medical):    Physical Activity:     Days of Exercise per Week:     Minutes of Exercise per Session:    Stress:     Feeling of Stress :    Social Connections:     Frequency of Communication with Friends and Family:     Frequency of Social Gatherings with Friends and Family:     Attends [employer] Services:     Active Member of Clubs or Organizations:     Attends Club or Organization Meetings:     Marital Status:          Review of Systems    Cardiovascular: no chest pain  Chest: no shortness of breath  Abd: no abd pain  Remainder review of systems negative    Objective:      Physical Exam  Vitals and nursing note reviewed.   Constitutional:       General: She is not in acute distress.     Appearance: She is well-developed.   HENT:      Head: Atraumatic.      Right Ear: External ear normal.      Left Ear: External ear normal.      Nose: Nose normal.      Mouth/Throat:      Pharynx: No oropharyngeal exudate.   Eyes:      General: No scleral icterus.     Conjunctiva/sclera: Conjunctivae normal.      Pupils: Pupils are equal, round, and reactive to light.   Neck:      Thyroid: No thyromegaly.   Cardiovascular:      Rate and Rhythm: Normal rate and regular rhythm.      Heart sounds: Normal heart sounds. No murmur heard.     Pulmonary:      Effort: Pulmonary effort is normal. No respiratory distress.      Breath sounds: Normal breath sounds. No wheezing or rales.   Abdominal:      General: Bowel sounds are normal. There is no distension.      Palpations: Abdomen is soft. There is no mass.      Tenderness: There is no abdominal tenderness. There is no guarding or rebound.   Musculoskeletal:         General: No tenderness. Normal range of motion.       Cervical back: Normal range of motion and neck supple.   Lymphadenopathy:      Cervical: No cervical adenopathy.   Skin:     General: Skin is warm.      Coloration: Skin is not pale.      Findings: No erythema or rash.   Neurological:      Mental Status: She is alert and oriented to person, place, and time.      Cranial Nerves: No cranial nerve deficit.      Motor: No abnormal muscle tone.      Coordination: Coordination normal.   Psychiatric:         Behavior: Behavior normal.         Thought Content: Thought content normal.         Judgment: Judgment normal.           Assessment:       1. Routine health maintenance    2. Essential hypertension    3. Iatrogenic hypothyroidism            Hyperchol  obesity  Plan:     Wt loss/exerc  Lab 12 months and follow up after    She plans to sched pap w gyn when due  Routine health maintenance    Encounter for screening mammogram for malignant neoplasm of breast  -     Mammo Digital Screening Bilat w/ Loy; Future; Expected date: 08/21/2024    Iatrogenic hypothyroidism  -     TSH; Future; Expected date: 06/10/2025    Essential hypertension  -     Comprehensive Metabolic Panel; Future; Expected date: 06/10/2025  -     Lipid Panel; Future; Expected date: 06/10/2025    Screen for colon cancer  -     Cologuard Screening (Multitarget Stool DNA); Future; Expected date: 06/10/2024

## 2024-08-05 DIAGNOSIS — Z30.41 ENCOUNTER FOR SURVEILLANCE OF CONTRACEPTIVE PILLS: ICD-10-CM

## 2024-08-05 RX ORDER — NORETHINDRONE 0.35 MG/1
1 TABLET ORAL
Qty: 84 TABLET | Refills: 0 | Status: SHIPPED | OUTPATIENT
Start: 2024-08-05

## 2024-09-13 ENCOUNTER — HOSPITAL ENCOUNTER (OUTPATIENT)
Dept: RADIOLOGY | Facility: HOSPITAL | Age: 44
Discharge: HOME | End: 2024-09-13
Attending: FAMILY MEDICINE
Payer: COMMERCIAL

## 2024-09-13 DIAGNOSIS — Z12.31 ENCOUNTER FOR SCREENING MAMMOGRAM FOR MALIGNANT NEOPLASM OF BREAST: ICD-10-CM

## 2024-09-13 PROCEDURE — 77063 BREAST TOMOSYNTHESIS BI: CPT | Mod: 26,,, | Performed by: RADIOLOGY

## 2024-09-13 PROCEDURE — 77067 SCR MAMMO BI INCL CAD: CPT | Mod: 26,,, | Performed by: RADIOLOGY

## 2024-09-13 PROCEDURE — 77067 SCR MAMMO BI INCL CAD: CPT | Mod: TC

## 2024-10-25 DIAGNOSIS — Z30.41 ENCOUNTER FOR SURVEILLANCE OF CONTRACEPTIVE PILLS: ICD-10-CM

## 2024-10-25 RX ORDER — NORETHINDRONE 0.35 MG/1
1 TABLET ORAL
Qty: 84 TABLET | Refills: 0 | OUTPATIENT
Start: 2024-10-25

## 2024-10-25 RX ORDER — NORETHINDRONE 0.35 MG/1
1 TABLET ORAL DAILY
Qty: 84 TABLET | Refills: 0 | Status: SHIPPED | OUTPATIENT
Start: 2024-10-25

## 2024-10-25 NOTE — TELEPHONE ENCOUNTER
Refill Decision Note   Sabrina Ureña  is requesting a refill authorization.  Brief Assessment and Rationale for Refill:  Quick Discontinue     Medication Therapy Plan:  Receipt confirmed by pharmacy (10/25/2024 10:19 AM CDT)    Medication Reconciliation Completed: No   Comments:     No Care Gaps recommended.     Note composed:1:40 PM 10/25/2024

## 2024-12-04 ENCOUNTER — IMMUNIZATION (OUTPATIENT)
Dept: INTERNAL MEDICINE | Facility: CLINIC | Age: 44
End: 2024-12-04
Payer: COMMERCIAL

## 2024-12-04 DIAGNOSIS — Z23 NEEDS FLU SHOT: Primary | ICD-10-CM

## 2024-12-04 PROCEDURE — 90656 IIV3 VACC NO PRSV 0.5 ML IM: CPT | Mod: S$GLB,,, | Performed by: FAMILY MEDICINE

## 2024-12-04 PROCEDURE — 90471 IMMUNIZATION ADMIN: CPT | Mod: S$GLB,,, | Performed by: FAMILY MEDICINE

## 2025-01-13 ENCOUNTER — OFFICE VISIT (OUTPATIENT)
Dept: OBSTETRICS AND GYNECOLOGY | Facility: CLINIC | Age: 45
End: 2025-01-13
Payer: COMMERCIAL

## 2025-01-13 VITALS
BODY MASS INDEX: 31.12 KG/M2 | SYSTOLIC BLOOD PRESSURE: 140 MMHG | HEIGHT: 60 IN | DIASTOLIC BLOOD PRESSURE: 90 MMHG | WEIGHT: 158.5 LBS

## 2025-01-13 DIAGNOSIS — Z30.41 ENCOUNTER FOR SURVEILLANCE OF CONTRACEPTIVE PILLS: Primary | ICD-10-CM

## 2025-01-13 PROCEDURE — 99396 PREV VISIT EST AGE 40-64: CPT | Mod: S$GLB,,, | Performed by: OBSTETRICS & GYNECOLOGY

## 2025-01-13 PROCEDURE — 99999 PR PBB SHADOW E&M-EST. PATIENT-LVL III: CPT | Mod: PBBFAC,,, | Performed by: OBSTETRICS & GYNECOLOGY

## 2025-01-13 RX ORDER — NORETHINDRONE 0.35 MG/1
1 TABLET ORAL DAILY
Qty: 84 TABLET | Refills: 4 | Status: SHIPPED | OUTPATIENT
Start: 2025-01-13

## 2025-01-13 NOTE — PROGRESS NOTES
Subjective     Patient ID: Sabrina Ureña is a 44 y.o. female.    Chief Complaint:  Annual Exam (Contraception/)      History of Present Illness  HPI  Annual Exam-Premenopausal  Patient presents for annual exam. The patient has no complaints today. The patient is sexually active. GYN screening history: last pap: approximate date  and was normal and last mammogram: approximate date 2024 and was normal. The patient wears seatbelts: yes. The patient participates in regular exercise: yes. Has the patient ever been transfused or tattooed?: yes. The patient reports that there is not domestic violence in her life.  Doing well with Micronor.  Needs refill.    GYN & OB History  Patient's last menstrual period was 2024.   Date of Last Pap: 2023    OB History    Para Term  AB Living   2 2 1     2   SAB IAB Ectopic Multiple Live Births         0 2      # Outcome Date GA Lbr Diego/2nd Weight Sex Type Anes PTL Lv   2 Term 20 38w2d / 00:45 2.855 kg (6 lb 4.7 oz) M Vag-Spont EPI N RAQUEL   1 Para 01   2.268 kg (5 lb) M Vag-Spont None  RAQUEL       Review of Systems  Review of Systems   Constitutional:  Negative for activity change, appetite change, chills, fatigue, fever and unexpected weight change.   Respiratory:  Negative for shortness of breath.    Cardiovascular:  Negative for chest pain, palpitations and leg swelling.   Gastrointestinal:  Negative for abdominal pain, bloating, blood in stool, constipation, diarrhea, nausea and vomiting.   Genitourinary:  Negative for dysmenorrhea, dyspareunia, dysuria, flank pain, frequency, genital sores, hematuria, menorrhagia, menstrual problem, pelvic pain, urgency, vaginal bleeding, vaginal discharge, vaginal pain, urinary incontinence, postcoital bleeding, vaginal dryness and vaginal odor.   Musculoskeletal:  Negative for back pain.   Integumentary:  Negative for breast mass, nipple discharge, breast skin changes and breast tenderness.    Neurological:  Negative for syncope and headaches.   Breast: Negative for asymmetry, lump, mass, mastodynia, nipple discharge, skin changes and tenderness         Objective   Physical Exam:   Constitutional: She is oriented to person, place, and time. She appears well-developed and well-nourished. No distress.    HENT:   Head: Normocephalic and atraumatic.    Eyes: Pupils are equal, round, and reactive to light. EOM are normal.     Cardiovascular:  Normal rate, regular rhythm and normal heart sounds.             Pulmonary/Chest: Effort normal and breath sounds normal.        Abdominal: Soft. Bowel sounds are normal. She exhibits no distension. There is no abdominal tenderness.     Genitourinary:    Vagina, uterus, right adnexa and left adnexa normal.      Pelvic exam was performed with patient supine.   There is no rash, tenderness, lesion or injury on the right labia. There is no rash, tenderness, lesion or injury on the left labia. Cervix is normal. Right adnexum displays no mass, no tenderness and no fullness. Left adnexum displays no mass, no tenderness and no fullness. No erythema, vaginal discharge, tenderness or bleeding in the vagina.    No foreign body in the vagina.      No signs of injury in the vagina.   Cervix exhibits no motion tenderness, no discharge and no friability. Uterus is not deviated, not enlarged and not tender.           Musculoskeletal: Normal range of motion and moves all extremeties. No tenderness or edema.       Neurological: She is alert and oriented to person, place, and time.    Skin: Skin is warm and dry.    Psychiatric: She has a normal mood and affect. Her behavior is normal. Thought content normal.            Assessment and Plan     1. Encounter for surveillance of contraceptive pills           Plan:  Encounter for surveillance of contraceptive pills  -     norethindrone (PACO) 0.35 mg tablet; Take 1 tablet (0.35 mg total) by mouth once daily.  Dispense: 84 tablet; Refill:  4  -     Doing well with Micronor. Continue use.  -     Pt was counseled on cervical/vaginal screening guidelines and recommendations.  Last pap NILM HPV neg on 2023.  As per current ASCCP guidelines, next pap is due 2026.  -     Pt was advised on current breast cancer screening recommendations.  Pt desires to proceed with screening MMG.  -     Follow up with PCP for routine health maintenance needs.      Follow up in about 1 year (around 1/13/2026).

## 2025-06-07 ENCOUNTER — LAB VISIT (OUTPATIENT)
Dept: LAB | Facility: HOSPITAL | Age: 45
End: 2025-06-07
Attending: FAMILY MEDICINE
Payer: COMMERCIAL

## 2025-06-07 DIAGNOSIS — E03.2 IATROGENIC HYPOTHYROIDISM: ICD-10-CM

## 2025-06-07 DIAGNOSIS — I10 ESSENTIAL HYPERTENSION: ICD-10-CM

## 2025-06-07 LAB
ALBUMIN SERPL BCP-MCNC: 4.3 G/DL (ref 3.5–5.2)
ALP SERPL-CCNC: 60 UNIT/L (ref 40–150)
ALT SERPL W/O P-5'-P-CCNC: 14 UNIT/L (ref 10–44)
ANION GAP (OHS): 9 MMOL/L (ref 8–16)
AST SERPL-CCNC: 17 UNIT/L (ref 11–45)
BILIRUB SERPL-MCNC: 0.5 MG/DL (ref 0.1–1)
BUN SERPL-MCNC: 14 MG/DL (ref 6–20)
CALCIUM SERPL-MCNC: 9.3 MG/DL (ref 8.7–10.5)
CHLORIDE SERPL-SCNC: 105 MMOL/L (ref 95–110)
CHOLEST SERPL-MCNC: 216 MG/DL (ref 120–199)
CHOLEST/HDLC SERPL: 3.9 {RATIO} (ref 2–5)
CO2 SERPL-SCNC: 25 MMOL/L (ref 23–29)
CREAT SERPL-MCNC: 0.7 MG/DL (ref 0.5–1.4)
GFR SERPLBLD CREATININE-BSD FMLA CKD-EPI: >60 ML/MIN/1.73/M2
GLUCOSE SERPL-MCNC: 89 MG/DL (ref 70–110)
HDLC SERPL-MCNC: 56 MG/DL (ref 40–75)
HDLC SERPL: 25.9 % (ref 20–50)
LDLC SERPL CALC-MCNC: 146.2 MG/DL (ref 63–159)
NONHDLC SERPL-MCNC: 160 MG/DL
POTASSIUM SERPL-SCNC: 3.8 MMOL/L (ref 3.5–5.1)
PROT SERPL-MCNC: 8.2 GM/DL (ref 6–8.4)
SODIUM SERPL-SCNC: 139 MMOL/L (ref 136–145)
TRIGL SERPL-MCNC: 69 MG/DL (ref 30–150)
TSH SERPL-ACNC: 3.62 UIU/ML (ref 0.4–4)

## 2025-06-07 PROCEDURE — 80061 LIPID PANEL: CPT

## 2025-06-07 PROCEDURE — 80053 COMPREHEN METABOLIC PANEL: CPT

## 2025-06-07 PROCEDURE — 84443 ASSAY THYROID STIM HORMONE: CPT

## 2025-06-07 PROCEDURE — 36415 COLL VENOUS BLD VENIPUNCTURE: CPT

## 2025-06-16 ENCOUNTER — OFFICE VISIT (OUTPATIENT)
Dept: INTERNAL MEDICINE | Facility: CLINIC | Age: 45
End: 2025-06-16
Payer: COMMERCIAL

## 2025-06-16 VITALS
HEIGHT: 59 IN | TEMPERATURE: 98 F | DIASTOLIC BLOOD PRESSURE: 82 MMHG | WEIGHT: 158.94 LBS | SYSTOLIC BLOOD PRESSURE: 132 MMHG | BODY MASS INDEX: 32.04 KG/M2 | OXYGEN SATURATION: 98 % | HEART RATE: 67 BPM

## 2025-06-16 DIAGNOSIS — L65.9 HAIR LOSS: ICD-10-CM

## 2025-06-16 DIAGNOSIS — I10 ESSENTIAL HYPERTENSION: ICD-10-CM

## 2025-06-16 DIAGNOSIS — Z00.00 ROUTINE HEALTH MAINTENANCE: Primary | ICD-10-CM

## 2025-06-16 DIAGNOSIS — E03.2 IATROGENIC HYPOTHYROIDISM: ICD-10-CM

## 2025-06-16 DIAGNOSIS — Z12.11 SCREEN FOR COLON CANCER: ICD-10-CM

## 2025-06-16 DIAGNOSIS — Z12.31 ENCOUNTER FOR SCREENING MAMMOGRAM FOR MALIGNANT NEOPLASM OF BREAST: ICD-10-CM

## 2025-06-16 PROCEDURE — 99396 PREV VISIT EST AGE 40-64: CPT | Mod: S$GLB,,, | Performed by: FAMILY MEDICINE

## 2025-06-16 PROCEDURE — 99999 PR PBB SHADOW E&M-EST. PATIENT-LVL V: CPT | Mod: PBBFAC,,, | Performed by: FAMILY MEDICINE

## 2025-06-16 NOTE — PROGRESS NOTES
Subjective:       Patient ID: Sabrina Ureña is a . female.    Chief Complaint: here for physical examination and issues  below    HPI Hypertension: blood pressures at home normal. Tolerating medicine.   Hypothyroidism: nl tsh. Tolerating med. Asympt    Elev chol with wt gain;cv risk low    Obesity bmi 32: had rxd ozempkc. noinsur coverd;exercising      Chrhairthinningasks about [place].    Review of Systems    Cardiovascular: no chest pain  Chest: no shortness of breath  Abd: no abd pain  Remainder review of systems negative    Objective:      Physical Exam  Vitals and nursing note reviewed.   Constitutional:       General: She is not in acute distress.     Appearance: She is well-developed.   HENT:      Head: Atraumatic.      Right Ear: External ear normal.      Left Ear: External ear normal.      Nose: Nose normal.      Mouth/Throat:      Pharynx: No oropharyngeal exudate.   Eyes:      General: No scleral icterus.     Conjunctiva/sclera: Conjunctivae normal.      Pupils: Pupils are equal, round, and reactive to light.   Neck:      Thyroid: No thyromegaly.   Cardiovascular:      Rate and Rhythm: Normal rate and regular rhythm.      Heart sounds: Normal heart sounds. No murmur heard.     Pulmonary:      Effort: Pulmonary effort is normal. No respiratory distress.      Breath sounds: Normal breath sounds. No wheezing or rales.   Abdominal:      General: Bowel sounds are normal. There is no distension.      Palpations: Abdomen is soft. There is no mass.      Tenderness: There is no abdominal tenderness. There is no guarding or rebound.   Musculoskeletal:         General: No tenderness. Normal range of motion.      Cervical back: Normal range of motion and neck supple.   Lymphadenopathy:      Cervical: No cervical adenopathy.   Skin:     General: Skin is warm.      Coloration: Skin is not pale.      Findings: No erythema or rash.   Neurological:      Mental Status: She is alert and oriented to person, place, and  time.      Cranial Nerves: No cranial nerve deficit.      Motor: No abnormal muscle tone.      Coordination: Coordination normal.   Psychiatric:         Behavior: Behavior normal.         Thought Content: Thought content normal.         Judgment: Judgment normal.           Assessment:       1. Routine health maintenance    2. Essential hypertension    3. Iatrogenic hypothyroidism            Hyperchol  Obesity  Hair loss  Plan:     Wt loss/exerc  Lab 12 months and follow up after  Notifyif dtc glp1    D/wd c canc screening .prefers colog.    Routine health maintenance    Encounter for screening mammogram for malignant neoplasm of breast  -     Mammo Digital Screening Bilat w/ Loy (XPD); Future; Expected date: 09/16/2025    Screen for colon cancer  -     Cologuard Screening (Multitarget Stool DNA); Future; Expected date: 06/16/2025    Essential hypertension  -     Comprehensive Metabolic Panel; Future; Expected date: 06/16/2026  -     Lipid Panel; Future; Expected date: 06/16/2026    Iatrogenic hypothyroidism  -     TSH; Future; Expected date: 06/16/2026    Hair loss  -     Ambulatory referral/consult to Dermatology; Future; Expected date: 06/23/2025

## 2025-06-16 NOTE — PATIENT INSTRUCTIONS
[employer] for direct to consumer weight loss med (zepbound)  Novonortis makes wegovy  Benefiber daily

## 2025-06-29 DIAGNOSIS — I10 ESSENTIAL HYPERTENSION: ICD-10-CM

## 2025-06-29 RX ORDER — LOSARTAN POTASSIUM AND HYDROCHLOROTHIAZIDE 12.5; 1 MG/1; MG/1
1 TABLET ORAL
Qty: 90 TABLET | Refills: 3 | Status: SHIPPED | OUTPATIENT
Start: 2025-06-29

## 2025-06-29 NOTE — TELEPHONE ENCOUNTER
Provider Staff:  Action required for this patient    Requires labs      Please see care gap opportunities below in Care Due Message.    Thanks!  [facility]     Appointments      Date Provider   Last Visit   6/16/2025 Alexys eTlles MD   Next Visit   Visit date not found Alexys Telles MD     Refill Decision Note   Sabrina Ureña  is requesting a refill authorization.  Brief Assessment and Rationale for Refill:  Approve     Medication Therapy Plan:        Comments:     Note composed:9:54 AM 06/29/2025

## 2025-06-29 NOTE — TELEPHONE ENCOUNTER
Care Due:                  Date            Visit Type   Department     Provider  --------------------------------------------------------------------------------                                EP -                              PRIMARY      [facility]  Last Visit: 06-      CARE ([facility])   CAITIE Telles                              EP -                              PRIMARY      [facility]  Next Visit: 06-      CARE ([facility])   CAITIE Telles                                                            Last  Test          Frequency    Reason                     Performed    Due Date  --------------------------------------------------------------------------------    CBC.........  12 months..  acyclovir................  Not Found    Overdue    [employer] Embedded Care Due Messages. Reference number: [id number].   6/29/2025 6:56:38 AM CDT

## 2025-07-09 ENCOUNTER — OFFICE VISIT (OUTPATIENT)
Dept: OPHTHALMOLOGY | Facility: CLINIC | Age: 45
End: 2025-07-09
Payer: COMMERCIAL

## 2025-07-09 DIAGNOSIS — Z01.00 ENCOUNTER FOR EYE EXAM: Primary | ICD-10-CM

## 2025-07-09 DIAGNOSIS — H52.7 REFRACTIVE ERRORS: ICD-10-CM

## 2025-07-09 DIAGNOSIS — Z98.890 HISTORY OF REFRACTIVE SURGERY: ICD-10-CM

## 2025-07-09 PROCEDURE — 99999 PR PBB SHADOW E&M-EST. PATIENT-LVL II: CPT | Mod: PBBFAC,,, | Performed by: OPTOMETRIST

## 2025-07-09 PROCEDURE — 92015 DETERMINE REFRACTIVE STATE: CPT | Mod: S$GLB,,, | Performed by: OPTOMETRIST

## 2025-07-09 PROCEDURE — 92004 COMPRE OPH EXAM NEW PT 1/>: CPT | Mod: S$GLB,,, | Performed by: OPTOMETRIST

## 2025-07-09 NOTE — PROGRESS NOTES
SUBJECTIVE  Sabrina Ureña is 45 y.o. female  Uncorrected distance visual acuity was 20/20 in the right eye and 20/30 +3 in the left eye.   Chief Complaint   Patient presents with    Annual Exam     Pt states she had PRK over 10 yrs ago and she notices she is having trouble driving at night.           HPI     Annual Exam     Additional comments: Pt states she had PRK over 10 yrs ago and she   notices she is having trouble driving at night.           Last edited by Lorin Cardona on 7/9/2025  2:10 PM.         Assessment /Plan :  1. Encounter for eye exam  No pathology.     2. History of refractive surgery  Had PRK done by Dr. Cyrus Nath around 2010   Possible -5.00 OU    3. Refractive errors  Dispense Final Rx for glasses.  RTC 1 year  Discussed above and answered questions.

## 2025-08-19 RX ORDER — LEVOTHYROXINE SODIUM 88 UG/1
88 TABLET ORAL
Qty: 90 TABLET | Refills: 3 | Status: SHIPPED | OUTPATIENT
Start: 2025-08-19